# Patient Record
Sex: FEMALE | Race: WHITE | NOT HISPANIC OR LATINO | Employment: OTHER | URBAN - METROPOLITAN AREA
[De-identification: names, ages, dates, MRNs, and addresses within clinical notes are randomized per-mention and may not be internally consistent; named-entity substitution may affect disease eponyms.]

---

## 2017-04-18 ENCOUNTER — ALLSCRIPTS OFFICE VISIT (OUTPATIENT)
Dept: OTHER | Facility: OTHER | Age: 57
End: 2017-04-18

## 2017-04-18 DIAGNOSIS — Z12.31 ENCOUNTER FOR SCREENING MAMMOGRAM FOR MALIGNANT NEOPLASM OF BREAST: ICD-10-CM

## 2017-04-18 PROCEDURE — 87624 HPV HI-RISK TYP POOLED RSLT: CPT | Performed by: OBSTETRICS & GYNECOLOGY

## 2017-04-18 PROCEDURE — G0145 SCR C/V CYTO,THINLAYER,RESCR: HCPCS | Performed by: OBSTETRICS & GYNECOLOGY

## 2017-04-20 ENCOUNTER — LAB REQUISITION (OUTPATIENT)
Dept: LAB | Facility: HOSPITAL | Age: 57
End: 2017-04-20
Payer: COMMERCIAL

## 2017-04-20 DIAGNOSIS — Z11.51 ENCOUNTER FOR SCREENING FOR HUMAN PAPILLOMAVIRUS (HPV): ICD-10-CM

## 2017-04-20 DIAGNOSIS — Z12.4 ENCOUNTER FOR SCREENING FOR MALIGNANT NEOPLASM OF CERVIX: ICD-10-CM

## 2017-04-21 LAB — HPV RRNA GENITAL QL NAA+PROBE: NORMAL

## 2017-04-25 LAB
LAB AP GYN PRIMARY INTERPRETATION: NORMAL
Lab: NORMAL

## 2017-06-14 ENCOUNTER — HOSPITAL ENCOUNTER (OUTPATIENT)
Dept: BONE DENSITY | Facility: IMAGING CENTER | Age: 57
Discharge: HOME | End: 2017-06-14
Payer: COMMERCIAL

## 2017-06-14 DIAGNOSIS — Z12.31 ENCOUNTER FOR SCREENING MAMMOGRAM FOR MALIGNANT NEOPLASM OF BREAST: ICD-10-CM

## 2017-06-14 PROCEDURE — G0202 SCR MAMMO BI INCL CAD: HCPCS

## 2018-01-14 VITALS
WEIGHT: 126.13 LBS | DIASTOLIC BLOOD PRESSURE: 64 MMHG | SYSTOLIC BLOOD PRESSURE: 118 MMHG | BODY MASS INDEX: 24.76 KG/M2 | HEIGHT: 60 IN

## 2018-08-13 ENCOUNTER — ANNUAL EXAM (OUTPATIENT)
Dept: OBGYN CLINIC | Facility: CLINIC | Age: 58
End: 2018-08-13
Payer: COMMERCIAL

## 2018-08-13 VITALS
DIASTOLIC BLOOD PRESSURE: 70 MMHG | HEIGHT: 62 IN | SYSTOLIC BLOOD PRESSURE: 112 MMHG | BODY MASS INDEX: 23.96 KG/M2 | WEIGHT: 130.2 LBS

## 2018-08-13 DIAGNOSIS — Z01.419 ENCOUNTER FOR ANNUAL ROUTINE GYNECOLOGICAL EXAMINATION: Primary | ICD-10-CM

## 2018-08-13 DIAGNOSIS — Z12.31 ENCOUNTER FOR SCREENING MAMMOGRAM FOR BREAST CANCER: ICD-10-CM

## 2018-08-13 PROCEDURE — 99396 PREV VISIT EST AGE 40-64: CPT | Performed by: OBSTETRICS & GYNECOLOGY

## 2018-08-13 RX ORDER — POLYMYXIN B SULFATE AND TRIMETHOPRIM 1; 10000 MG/ML; [USP'U]/ML
SOLUTION OPHTHALMIC
Refills: 0 | COMMUNITY
Start: 2018-06-04 | End: 2020-01-18 | Stop reason: ALTCHOICE

## 2018-08-13 NOTE — PROGRESS NOTES
Assessment/Plan:    Mammogram reviewed with her, prescription given so that she can schedule this    Pap up to date, discussed Pap guidelines    We reviewed DEXA guidelines, discussed weight-bearing exercise, muscle strengthening exercise, balance and flexibility, calcium and vitamin-D    Colon cancer screening is due, she was given information for GI  She checked with her insurance last year and could not find a female physician but she is going to recheck this year  No problem-specific Assessment & Plan notes found for this encounter  Diagnoses and all orders for this visit:    Encounter for annual routine gynecological examination    Encounter for screening mammogram for breast cancer    Other orders  -     polymyxin b-trimethoprim (POLYTRIM) ophthalmic solution; INSTILL 1 DROP INTO AFFECTED EYE 4 TIMES A DAY FOR 5 DAYS          Subjective:      Patient ID: Graciela Gallagher is a 62 y o  female  Patient here for yearly  She had a normal Pap and negative HPV in 2017  She is due for a mammogram   She has no gyn complaints  She has some questions about baseline DEXA  The following portions of the patient's history were reviewed and updated as appropriate: allergies, current medications, past family history, past medical history, past social history, past surgical history and problem list     Review of Systems   Constitutional: Negative  HENT: Negative  Eyes: Negative  Respiratory: Negative  Cardiovascular: Negative  Gastrointestinal: Negative  Endocrine: Negative  Genitourinary: Negative  Musculoskeletal: Negative  Skin: Negative  Allergic/Immunologic: Negative  Neurological: Negative  Hematological: Negative  Psychiatric/Behavioral: Negative  Objective:      /70 (BP Location: Left arm, Patient Position: Sitting, Cuff Size: Adult)   Ht 5' 2" (1 575 m)   Wt 59 1 kg (130 lb 3 2 oz)   LMP  (LMP Unknown)   Breastfeeding?  No   BMI 23 81 kg/m² Physical Exam   Constitutional: She appears well-developed  Neck: No tracheal deviation present  No thyromegaly present  Cardiovascular: Normal rate and regular rhythm  Pulmonary/Chest: Effort normal and breath sounds normal  Right breast exhibits no inverted nipple, no mass, no nipple discharge, no skin change and no tenderness  Left breast exhibits no inverted nipple, no mass, no nipple discharge, no skin change and no tenderness  Breasts are symmetrical    Examined seated and supine   Abdominal: Soft  She exhibits no distension and no mass  There is no tenderness  Genitourinary: Rectum normal, vagina normal and uterus normal  No labial fusion  There is no rash, tenderness, lesion or injury on the right labia  There is no rash, tenderness, lesion or injury on the left labia  Cervix exhibits no motion tenderness, no discharge and no friability  Right adnexum displays no mass, no tenderness and no fullness  Left adnexum displays no mass, no tenderness and no fullness  Vitals reviewed

## 2018-08-31 ENCOUNTER — HOSPITAL ENCOUNTER (OUTPATIENT)
Dept: BONE DENSITY | Facility: IMAGING CENTER | Age: 58
Discharge: HOME | End: 2018-08-31
Payer: COMMERCIAL

## 2018-08-31 DIAGNOSIS — Z12.31 ENCOUNTER FOR SCREENING MAMMOGRAM FOR BREAST CANCER: ICD-10-CM

## 2018-08-31 PROCEDURE — 77067 SCR MAMMO BI INCL CAD: CPT

## 2019-08-27 ENCOUNTER — ANNUAL EXAM (OUTPATIENT)
Dept: OBGYN CLINIC | Facility: CLINIC | Age: 59
End: 2019-08-27
Payer: COMMERCIAL

## 2019-08-27 VITALS
BODY MASS INDEX: 25.48 KG/M2 | HEIGHT: 60 IN | SYSTOLIC BLOOD PRESSURE: 104 MMHG | WEIGHT: 129.8 LBS | DIASTOLIC BLOOD PRESSURE: 60 MMHG

## 2019-08-27 DIAGNOSIS — R29.890 LOSS OF HEIGHT: ICD-10-CM

## 2019-08-27 DIAGNOSIS — Z12.31 ENCOUNTER FOR SCREENING MAMMOGRAM FOR BREAST CANCER: ICD-10-CM

## 2019-08-27 DIAGNOSIS — Z01.419 ENCOUNTER FOR ANNUAL ROUTINE GYNECOLOGICAL EXAMINATION: Primary | ICD-10-CM

## 2019-08-27 PROCEDURE — 99396 PREV VISIT EST AGE 40-64: CPT | Performed by: OBSTETRICS & GYNECOLOGY

## 2019-08-27 NOTE — PROGRESS NOTES
Assessment/Plan:    pap is up to date    mammogram reviewed with her including breast density  RX given so that she can schedule this  Discussed self breast exams    Loss of height-I reviewed weight-bearing and muscle strengthening exercises with her along with calcium and D  I gave her a slip for a DEXA and recommended that she call to check on coverage before having it done  colon cancer screening - colonoscopy done in 2013, due in 2023    discussed preventive care, regular exercise and a healthy diet      No problem-specific Assessment & Plan notes found for this encounter  Diagnoses and all orders for this visit:    Encounter for annual routine gynecological examination    Encounter for screening mammogram for breast cancer  -     Mammo screening bilateral w 3d & cad; Future    Loss of height  -     DXA bone density spine hip and pelvis; Future          Subjective:      Patient ID: Meena Jacob is a 61 y o  female  Pt here for yearly  She has no gyn complaints  She is concerned about her loss of height  She states that she was always 5'2"but last year and this year she measured 5'  Her mother has osteoporosis  She is a nonsmoker and does not take medications that would decrease bone density  The following portions of the patient's history were reviewed and updated as appropriate: allergies, current medications, past family history, past medical history, past social history, past surgical history and problem list     Review of Systems   Constitutional: Negative  HENT: Negative  Eyes: Negative  Respiratory: Negative  Cardiovascular: Negative  Gastrointestinal: Negative  Endocrine: Negative  Genitourinary: Negative  Musculoskeletal: Negative  Skin: Negative  Allergic/Immunologic: Negative  Neurological: Negative  Hematological: Negative  Psychiatric/Behavioral: Negative            Objective:      /60 (BP Location: Left arm, Patient Position: Sitting, Cuff Size: Standard)   Ht 5' (1 524 m)   Wt 58 9 kg (129 lb 12 8 oz)   LMP  (LMP Unknown)   BMI 25 35 kg/m²          Physical Exam   Constitutional: She appears well-developed  Neck: No tracheal deviation present  No thyromegaly present  Cardiovascular: Normal rate and regular rhythm  Pulmonary/Chest: Effort normal and breath sounds normal  Right breast exhibits no inverted nipple, no mass, no nipple discharge, no skin change and no tenderness  Left breast exhibits no inverted nipple, no mass, no nipple discharge, no skin change and no tenderness  Breasts are symmetrical    Examined seated and supine   Abdominal: Soft  She exhibits no distension and no mass  There is no tenderness  Genitourinary: Rectum normal, vagina normal and uterus normal  No labial fusion  There is no rash, tenderness, lesion or injury on the right labia  There is no rash, tenderness, lesion or injury on the left labia  Cervix exhibits no motion tenderness, no discharge and no friability  Right adnexum displays no mass, no tenderness and no fullness  Left adnexum displays no mass, no tenderness and no fullness  Vitals reviewed

## 2019-10-04 ENCOUNTER — OFFICE VISIT (OUTPATIENT)
Dept: FAMILY MEDICINE CLINIC | Facility: CLINIC | Age: 59
End: 2019-10-04
Payer: COMMERCIAL

## 2019-10-04 VITALS
SYSTOLIC BLOOD PRESSURE: 120 MMHG | OXYGEN SATURATION: 94 % | TEMPERATURE: 99.4 F | HEART RATE: 106 BPM | BODY MASS INDEX: 25.91 KG/M2 | WEIGHT: 132 LBS | DIASTOLIC BLOOD PRESSURE: 84 MMHG | HEIGHT: 60 IN

## 2019-10-04 DIAGNOSIS — N30.01 ACUTE CYSTITIS WITH HEMATURIA: Primary | ICD-10-CM

## 2019-10-04 DIAGNOSIS — Z23 NEED FOR INFLUENZA VACCINATION: ICD-10-CM

## 2019-10-04 DIAGNOSIS — Z23 NEED FOR TDAP VACCINATION: ICD-10-CM

## 2019-10-04 LAB
SL AMB  POCT GLUCOSE, UA: NEGATIVE
SL AMB LEUKOCYTE ESTERASE,UA: ABNORMAL
SL AMB POCT BILIRUBIN,UA: NEGATIVE
SL AMB POCT BLOOD,UA: ABNORMAL
SL AMB POCT CLARITY,UA: ABNORMAL
SL AMB POCT COLOR,UA: ABNORMAL
SL AMB POCT KETONES,UA: NEGATIVE
SL AMB POCT NITRITE,UA: NEGATIVE
SL AMB POCT PH,UA: 6.5
SL AMB POCT SPECIFIC GRAVITY,UA: 1.02
SL AMB POCT URINE PROTEIN: ABNORMAL
SL AMB POCT UROBILINOGEN: 0.2

## 2019-10-04 PROCEDURE — 99203 OFFICE O/P NEW LOW 30 MIN: CPT | Performed by: FAMILY MEDICINE

## 2019-10-04 PROCEDURE — 81002 URINALYSIS NONAUTO W/O SCOPE: CPT | Performed by: FAMILY MEDICINE

## 2019-10-04 RX ORDER — DIPHENOXYLATE HYDROCHLORIDE AND ATROPINE SULFATE 2.5; .025 MG/1; MG/1
1 TABLET ORAL DAILY
COMMUNITY
End: 2021-07-29

## 2019-10-04 RX ORDER — SULFAMETHOXAZOLE AND TRIMETHOPRIM 800; 160 MG/1; MG/1
1 TABLET ORAL EVERY 12 HOURS SCHEDULED
Qty: 6 TABLET | Refills: 0 | Status: SHIPPED | OUTPATIENT
Start: 2019-10-04 | End: 2019-10-07

## 2019-10-04 NOTE — PATIENT INSTRUCTIONS

## 2019-10-04 NOTE — PROGRESS NOTES
Subjective:   Chief Complaint   Patient presents with    New Patient     pt was last seen at [facility]  Pt is changing practices because she was unhappy with her previous PCP   Urinary Tract Infection     pt woke up with frequency, urgency, pressure, and she noticed some discoloration when wiping  Pt defers tdap and flu shot today  Patient ID: Eve Gao is a 61 y o  female  Patient is here today with symptoms of a urinary tract infection  she has had had urinary tract infections in the past  sx for 1 days  + urinary frequency  + urgency  + dysuria   + hematuria  no flank pain  no fever/chills  no n/v          The following portions of the patient's history were reviewed and updated as appropriate: allergies, current medications, past family history, past medical history, past social history, past surgical history and problem list     Review of Systems      Objective:  Vitals:    10/04/19 1344   BP: 120/84   Pulse: (!) 106   Temp: 99 4 °F (37 4 °C)   SpO2: 94%   Weight: 59 9 kg (132 lb)   Height: 5' (1 524 m)      Physical Exam   Constitutional: She is oriented to person, place, and time  Vital signs are normal  She appears well-developed and well-nourished  She does not appear ill  No distress  Abdominal: Soft  Normal appearance  There is no CVA tenderness  Neurological: She is alert and oriented to person, place, and time  No cranial nerve deficit  Coordination normal    Skin: Skin is warm, dry and intact  No rash noted  She is not diaphoretic  No erythema  Psychiatric: She has a normal mood and affect  Her behavior is normal  Judgment and thought content normal        Diabetic Foot Exam      Assessment/Plan:    No problem-specific Assessment & Plan notes found for this encounter  Diagnoses and all orders for this visit:    Acute cystitis with hematuria  -     sulfamethoxazole-trimethoprim (BACTRIM DS) 800-160 mg per tablet;  Take 1 tablet by mouth every 12 (twelve) hours for 3 days    I suspect that the pt has a UTI  I prescribed antibiotics and will send the urine for culture  BMI 25 0-25 9,adult    Other orders  -     multivitamin (THERAGRAN) TABS; Take 1 tablet by mouth daily  -     MELATONIN PO; Take by mouth  -     Hyaluronic Acid-Vitamin C (HYALURONIC ACID PO); Take by mouth      BMI Counseling: Body mass index is 25 78 kg/m²  The BMI is above normal  Exercise recommendations include exercising 3-5 times per week

## 2019-10-25 ENCOUNTER — HOSPITAL ENCOUNTER (OUTPATIENT)
Dept: MAMMOGRAPHY | Facility: IMAGING CENTER | Age: 59
Discharge: HOME | End: 2019-10-25
Payer: COMMERCIAL

## 2019-10-25 VITALS — BODY MASS INDEX: 24.92 KG/M2 | WEIGHT: 132 LBS | HEIGHT: 61 IN

## 2019-10-25 DIAGNOSIS — Z12.31 ENCOUNTER FOR SCREENING MAMMOGRAM FOR BREAST CANCER: ICD-10-CM

## 2019-10-25 PROCEDURE — 77067 SCR MAMMO BI INCL CAD: CPT

## 2019-10-25 PROCEDURE — 77063 BREAST TOMOSYNTHESIS BI: CPT

## 2019-12-03 ENCOUNTER — TELEPHONE (OUTPATIENT)
Dept: FAMILY MEDICINE CLINIC | Facility: CLINIC | Age: 59
End: 2019-12-03

## 2019-12-03 DIAGNOSIS — J45.20 MILD INTERMITTENT ASTHMA WITHOUT COMPLICATION: Primary | ICD-10-CM

## 2019-12-03 RX ORDER — ALBUTEROL SULFATE 90 UG/1
2 AEROSOL, METERED RESPIRATORY (INHALATION) EVERY 6 HOURS PRN
Qty: 1 EACH | Refills: 1 | Status: SHIPPED | OUTPATIENT
Start: 2019-12-03 | End: 2020-01-18 | Stop reason: ALTCHOICE

## 2020-01-18 ENCOUNTER — OFFICE VISIT (OUTPATIENT)
Dept: FAMILY MEDICINE CLINIC | Facility: CLINIC | Age: 60
End: 2020-01-18
Payer: COMMERCIAL

## 2020-01-18 VITALS
BODY MASS INDEX: 25.34 KG/M2 | HEIGHT: 61 IN | HEART RATE: 128 BPM | WEIGHT: 134.25 LBS | OXYGEN SATURATION: 98 % | TEMPERATURE: 98.6 F | DIASTOLIC BLOOD PRESSURE: 80 MMHG | SYSTOLIC BLOOD PRESSURE: 113 MMHG

## 2020-01-18 DIAGNOSIS — Z13.220 SCREENING FOR LIPID DISORDERS: ICD-10-CM

## 2020-01-18 DIAGNOSIS — Z13.0 SCREENING FOR ENDOCRINE, METABOLIC AND IMMUNITY DISORDER: ICD-10-CM

## 2020-01-18 DIAGNOSIS — Z13.29 SCREENING FOR ENDOCRINE, METABOLIC AND IMMUNITY DISORDER: ICD-10-CM

## 2020-01-18 DIAGNOSIS — G56.03 CARPAL TUNNEL SYNDROME ON BOTH SIDES: Primary | ICD-10-CM

## 2020-01-18 DIAGNOSIS — Z13.0 SCREENING FOR IRON DEFICIENCY ANEMIA: ICD-10-CM

## 2020-01-18 DIAGNOSIS — Z13.29 SCREENING FOR THYROID DISORDER: ICD-10-CM

## 2020-01-18 DIAGNOSIS — Z13.228 SCREENING FOR ENDOCRINE, METABOLIC AND IMMUNITY DISORDER: ICD-10-CM

## 2020-01-18 DIAGNOSIS — Z11.59 ENCOUNTER FOR HEPATITIS C SCREENING TEST FOR LOW RISK PATIENT: ICD-10-CM

## 2020-01-18 PROCEDURE — 99213 OFFICE O/P EST LOW 20 MIN: CPT | Performed by: FAMILY MEDICINE

## 2020-01-18 NOTE — PATIENT INSTRUCTIONS
Schedule fasting blood work - can schedule here on Tuesday or Thursday am or at lab  Wrist splints Deanna iglesias 89   If symptoms persist, consider emg  Ibuprofen or advil as needed     Carpal Tunnel Syndrome   WHAT YOU SHOULD KNOW:   Carpal tunnel syndrome (CTS) is a condition where there is increased pressure on the median nerve in the wrist  The median nerve controls muscles and feeling in the hand  Pressure may come from overuse and swelling of ligaments in the wrist    INSTRUCTIONS:   Medicines:   · NSAIDs:  These medicines decrease swelling and pain  NSAIDs are available without a doctor's order  Ask your primary healthcare provider which medicine is right for you and how much to take  Take as directed  NSAIDs can cause stomach bleeding or kidney problems if not taken correctly  · Take your medicine as directed  Call your healthcare provider if you think your medicine is not helping or if you have side effects  Tell him if you are allergic to any medicine  Keep a list of the medicines, vitamins, and herbs you take  Include the amounts, and when and why you take them  Bring the list or the pill bottles to follow-up visits  Carry your medicine list with you in case of an emergency  Follow up with your healthcare provider as directed:  Write down your questions so you remember to ask them during your visits  Manage your symptoms:   · Use ice:  Ice helps decrease swelling and pain in your wrist  Ice may also help prevent tissue damage  Use an ice pack or put crushed ice in a plastic bag  Cover the ice pack with a towel and place it on your wrist for 15 to 20 minutes every hour  · Get physical and occupational therapy:  Physical therapists will show you ways to exercise and strengthen your wrist  Occupational therapists will show you safe ways to use your wrist while you do your usual activities       · Rest your hands:  Let your hands rest for a short time between repetitive motions, such as typing  If you feel pain, stop what you are doing and gently massage your wrist and hand  · Use a wrist splint: This keeps your wrist straight or in a slightly bent position  A wrist splint decreases pressure on the median nerve by letting your wrist rest  You may need to wear the splint for up to 8 weeks  You may need to wear your wrist splint at night  · Evaluate your work habits:  Ask about ways to modify your work to help decrease your symptoms  Contact your primary healthcare provider if:   · Your symptoms are worse than before  · You have questions or concerns about your condition or care  Return to the emergency department if:   · You suddenly lose feeling and cannot move your hand  · Your hand suddenly changes color  © 2014 [employer] is for End User's use only and may not be sold, redistributed or otherwise used for commercial purposes  All illustrations and images included in CareNotes® are the copyrighted property of A D A M [employer]  or Angel Gao  The above information is an  only  It is not intended as medical advice for individual conditions or treatments  Talk to your doctor, nurse or pharmacist before following any medical regimen to see if it is safe and effective for you
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
## 2020-01-20 ENCOUNTER — VBI (OUTPATIENT)
Dept: FAMILY MEDICINE CLINIC | Facility: CLINIC | Age: 60
End: 2020-01-20

## 2020-01-20 NOTE — TELEPHONE ENCOUNTER
Tori Rider MA  [facility]             01/18/20 9:11 AM     Hello, our patient Chato Guardaod has had CRC: Colonoscopy completed/performed  Please assist in updating the patient chart by making an External outreach to [facility] located in [place], [place]  The date of service is 2013 according to the patient  She would be due in 2023  Thank you,   Tori Rider MA   [facility] [address] [address]     Faxed to [facility] [phone] [facility] [place], [place] 01/20/20 Kennedi Yoon MA     Received Colonoscopy DOS 9/6/2011 with 5-10 year recall; DOS is unclear on image  01/21/2020 10:35 AM Phone (Jens Reese) Barbara Tuttle at [facility] (Provider) [phone] Remove   Call Complete - She confirmed DOS is 9/6/2011; this was in an old system where records were scanned in; image of report is also unclear        By Kennedi Yoon MA

## 2020-01-20 NOTE — LETTER
Procedure Request Form: Colonoscopy      Date Requested: 20  Patient: Romulo Elders  Patient : 1960   Referring Provider: Niru Jeffry, MD        Date of Procedure ______________________________       The above patient has informed us that they have completed their   most recent Colonoscopy at your facility  Please complete   this form and attach all corresponding procedure reports/results  Comments __________________________________________________________  ____________________________________________________________________  ____________________________________________________________________  ____________________________________________________________________    Facility Completing Procedure _________________________________________    Form Completed By (print name) _______________________________________      Signature __________________________________________________________      These reports are needed for  compliance    Please fax this completed form and a copy of the procedure report to our office as soon as possible to [phone] magi Hutchinson: Phone [phone]    We thank you for your assistance in treating our mutual patient     (sent to [facility])

## 2020-08-30 ENCOUNTER — TELEPHONE (OUTPATIENT)
Dept: OTHER | Facility: OTHER | Age: 60
End: 2020-08-30

## 2020-09-07 ENCOUNTER — TELEPHONE (OUTPATIENT)
Dept: OTHER | Facility: OTHER | Age: 60
End: 2020-09-07

## 2020-10-08 ENCOUNTER — OFFICE VISIT (OUTPATIENT)
Dept: FAMILY MEDICINE CLINIC | Facility: CLINIC | Age: 60
End: 2020-10-08
Payer: COMMERCIAL

## 2020-10-08 VITALS
OXYGEN SATURATION: 98 % | SYSTOLIC BLOOD PRESSURE: 92 MMHG | HEIGHT: 61 IN | BODY MASS INDEX: 25.06 KG/M2 | TEMPERATURE: 97.9 F | WEIGHT: 132.75 LBS | HEART RATE: 97 BPM | DIASTOLIC BLOOD PRESSURE: 68 MMHG

## 2020-10-08 DIAGNOSIS — Z13.0 SCREENING, ANEMIA, DEFICIENCY, IRON: ICD-10-CM

## 2020-10-08 DIAGNOSIS — Z13.29 SCREENING FOR THYROID DISORDER: ICD-10-CM

## 2020-10-08 DIAGNOSIS — Z13.220 SCREENING FOR LIPID DISORDERS: ICD-10-CM

## 2020-10-08 DIAGNOSIS — Z11.59 ENCOUNTER FOR HEPATITIS C SCREENING TEST FOR LOW RISK PATIENT: ICD-10-CM

## 2020-10-08 DIAGNOSIS — Z13.1 SCREENING FOR DIABETES MELLITUS: ICD-10-CM

## 2020-10-08 DIAGNOSIS — Z00.00 ANNUAL PHYSICAL EXAM: Primary | ICD-10-CM

## 2020-10-08 PROCEDURE — 99396 PREV VISIT EST AGE 40-64: CPT | Performed by: FAMILY MEDICINE

## 2020-11-16 ENCOUNTER — TELEPHONE (OUTPATIENT)
Dept: OBGYN CLINIC | Facility: CLINIC | Age: 60
End: 2020-11-16

## 2020-11-17 ENCOUNTER — ANNUAL EXAM (OUTPATIENT)
Dept: OBGYN CLINIC | Facility: CLINIC | Age: 60
End: 2020-11-17
Payer: COMMERCIAL

## 2020-11-17 VITALS
DIASTOLIC BLOOD PRESSURE: 82 MMHG | TEMPERATURE: 97.2 F | SYSTOLIC BLOOD PRESSURE: 124 MMHG | WEIGHT: 132 LBS | HEIGHT: 60 IN | BODY MASS INDEX: 25.91 KG/M2

## 2020-11-17 DIAGNOSIS — Z12.31 ENCOUNTER FOR SCREENING MAMMOGRAM FOR BREAST CANCER: ICD-10-CM

## 2020-11-17 DIAGNOSIS — Z01.419 ENCOUNTER FOR ANNUAL ROUTINE GYNECOLOGICAL EXAMINATION: Primary | ICD-10-CM

## 2020-11-17 PROCEDURE — 99396 PREV VISIT EST AGE 40-64: CPT | Performed by: OBSTETRICS & GYNECOLOGY

## 2020-12-03 ENCOUNTER — TELEMEDICINE (OUTPATIENT)
Dept: FAMILY MEDICINE CLINIC | Facility: CLINIC | Age: 60
End: 2020-12-03
Payer: COMMERCIAL

## 2020-12-03 VITALS — TEMPERATURE: 97.1 F | WEIGHT: 132 LBS | BODY MASS INDEX: 25.91 KG/M2 | HEIGHT: 60 IN

## 2020-12-03 DIAGNOSIS — B34.9 VIRAL INFECTION, UNSPECIFIED: ICD-10-CM

## 2020-12-03 DIAGNOSIS — B34.9 VIRAL INFECTION, UNSPECIFIED: Primary | ICD-10-CM

## 2020-12-03 PROCEDURE — U0003 INFECTIOUS AGENT DETECTION BY NUCLEIC ACID (DNA OR RNA); SEVERE ACUTE RESPIRATORY SYNDROME CORONAVIRUS 2 (SARS-COV-2) (CORONAVIRUS DISEASE [COVID-19]), AMPLIFIED PROBE TECHNIQUE, MAKING USE OF HIGH THROUGHPUT TECHNOLOGIES AS DESCRIBED BY CMS-2020-01-R: HCPCS | Performed by: FAMILY MEDICINE

## 2020-12-03 PROCEDURE — 99213 OFFICE O/P EST LOW 20 MIN: CPT | Performed by: FAMILY MEDICINE

## 2020-12-04 LAB — SARS-COV-2 RNA SPEC QL NAA+PROBE: DETECTED

## 2020-12-07 ENCOUNTER — TELEPHONE (OUTPATIENT)
Dept: FAMILY MEDICINE CLINIC | Facility: CLINIC | Age: 60
End: 2020-12-07

## 2020-12-08 ENCOUNTER — TELEPHONE (OUTPATIENT)
Dept: FAMILY MEDICINE CLINIC | Facility: CLINIC | Age: 60
End: 2020-12-08

## 2020-12-08 ENCOUNTER — TELEPHONE (OUTPATIENT)
Dept: OTHER | Facility: OTHER | Age: 60
End: 2020-12-08

## 2020-12-14 ENCOUNTER — TELEPHONE (OUTPATIENT)
Dept: FAMILY MEDICINE CLINIC | Facility: CLINIC | Age: 60
End: 2020-12-14

## 2021-01-26 ENCOUNTER — HOSPITAL ENCOUNTER (OUTPATIENT)
Dept: MAMMOGRAPHY | Facility: IMAGING CENTER | Age: 61
Discharge: HOME | End: 2021-01-26
Payer: COMMERCIAL

## 2021-01-26 VITALS — BODY MASS INDEX: 25.91 KG/M2 | HEIGHT: 60 IN | WEIGHT: 132 LBS

## 2021-01-26 DIAGNOSIS — Z12.31 ENCOUNTER FOR SCREENING MAMMOGRAM FOR BREAST CANCER: ICD-10-CM

## 2021-01-26 PROCEDURE — 77063 BREAST TOMOSYNTHESIS BI: CPT

## 2021-01-26 PROCEDURE — 77067 SCR MAMMO BI INCL CAD: CPT

## 2021-02-17 ENCOUNTER — TELEPHONE (OUTPATIENT)
Dept: FAMILY MEDICINE CLINIC | Facility: CLINIC | Age: 61
End: 2021-02-17

## 2021-02-17 NOTE — TELEPHONE ENCOUNTER
So actually she can't get a test now - she is within 90 days of when she actually had COVID and during those 90 days we do not restest   She should have very good immunity for 90 days from her illness, likely more than 90 days

## 2021-02-17 NOTE — TELEPHONE ENCOUNTER
Pt is asking if you can put an order for covid test    pt was exposure on 2/7 Sunday  Pt is not experiencing symptoms and no mask on  Please adv pt [phone]

## 2021-02-17 NOTE — TELEPHONE ENCOUNTER
At this time without symptoms the patient is past her 10 day quarantine and technically does not need a test   I can order one if she really wants one but it is not necessary

## 2021-06-08 DIAGNOSIS — Z13.29 SCREENING FOR ENDOCRINE, METABOLIC AND IMMUNITY DISORDER: ICD-10-CM

## 2021-06-08 DIAGNOSIS — Z13.0 SCREENING FOR ENDOCRINE, METABOLIC AND IMMUNITY DISORDER: ICD-10-CM

## 2021-06-08 DIAGNOSIS — Z11.59 ENCOUNTER FOR HEPATITIS C SCREENING TEST FOR LOW RISK PATIENT: ICD-10-CM

## 2021-06-08 DIAGNOSIS — Z13.220 SCREENING FOR LIPID DISORDERS: ICD-10-CM

## 2021-06-08 DIAGNOSIS — Z13.29 SCREENING FOR THYROID DISORDER: ICD-10-CM

## 2021-06-08 DIAGNOSIS — Z13.228 SCREENING FOR ENDOCRINE, METABOLIC AND IMMUNITY DISORDER: ICD-10-CM

## 2021-06-08 DIAGNOSIS — Z13.0 SCREENING FOR IRON DEFICIENCY ANEMIA: ICD-10-CM

## 2021-06-08 DIAGNOSIS — Z13.0 SCREENING, ANEMIA, DEFICIENCY, IRON: ICD-10-CM

## 2021-07-29 ENCOUNTER — OFFICE VISIT (OUTPATIENT)
Dept: FAMILY MEDICINE CLINIC | Facility: CLINIC | Age: 61
End: 2021-07-29
Payer: COMMERCIAL

## 2021-07-29 VITALS
HEIGHT: 60 IN | OXYGEN SATURATION: 98 % | WEIGHT: 132 LBS | DIASTOLIC BLOOD PRESSURE: 62 MMHG | BODY MASS INDEX: 25.91 KG/M2 | SYSTOLIC BLOOD PRESSURE: 122 MMHG | HEART RATE: 78 BPM
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
## 2021-07-29 DIAGNOSIS — S13.9XXA NECK SPRAIN, INITIAL ENCOUNTER: ICD-10-CM

## 2021-07-29 DIAGNOSIS — M79.673 PAIN OF FOOT, UNSPECIFIED LATERALITY: Primary | ICD-10-CM

## 2021-07-29 PROBLEM — Z13.0 SCREENING FOR IRON DEFICIENCY ANEMIA: Status: RESOLVED | Noted: 2020-01-18 | Resolved: 2021-07-29

## 2021-07-29 PROCEDURE — 99213 OFFICE O/P EST LOW 20 MIN: CPT | Performed by: FAMILY MEDICINE

## 2021-07-29 RX ORDER — OMEGA-3/DHA/EPA/FISH OIL 300-1000MG
CAPSULE ORAL
COMMUNITY

## 2021-07-29 RX ORDER — DOXYCYCLINE 100 MG/10ML
INJECTION, POWDER, LYOPHILIZED, FOR SOLUTION INTRAVENOUS
COMMUNITY
End: 2021-07-29 | Stop reason: ALTCHOICE

## 2021-07-29 RX ORDER — ALBUTEROL SULFATE 90 UG/1
AEROSOL, METERED RESPIRATORY (INHALATION)
COMMUNITY
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
## 2021-07-30 ENCOUNTER — TELEPHONE (OUTPATIENT)
Dept: FAMILY MEDICINE CLINIC | Facility: CLINIC | Age: 61
End: 2021-07-30

## 2021-07-30 ENCOUNTER — EVALUATION (OUTPATIENT)
Dept: PHYSICAL THERAPY | Facility: CLINIC | Age: 61
End: 2021-07-30
Payer: COMMERCIAL

## 2021-07-30 DIAGNOSIS — S13.9XXA NECK SPRAIN, INITIAL ENCOUNTER: Primary | ICD-10-CM

## 2021-07-30 PROCEDURE — 97014 ELECTRIC STIMULATION THERAPY: CPT | Performed by: PHYSICAL THERAPIST

## 2021-07-30 PROCEDURE — 97162 PT EVAL MOD COMPLEX 30 MIN: CPT | Performed by: PHYSICAL THERAPIST

## 2021-07-30 PROCEDURE — 97140 MANUAL THERAPY 1/> REGIONS: CPT | Performed by: PHYSICAL THERAPIST

## 2021-07-30 NOTE — TELEPHONE ENCOUNTER
She needs a referral to go to PT for her right foot  She is going to [facility]  Can you put one in for her  She said she talked to you yesterday about it
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
## 2021-08-02 ENCOUNTER — EVALUATION (OUTPATIENT)
Dept: PHYSICAL THERAPY | Facility: CLINIC | Age: 61
End: 2021-08-02
Payer: COMMERCIAL

## 2021-08-02 DIAGNOSIS — M77.31 HEEL SPUR, RIGHT: ICD-10-CM

## 2021-08-02 DIAGNOSIS — M72.2 PLANTAR FASCIITIS OF RIGHT FOOT: ICD-10-CM

## 2021-08-02 DIAGNOSIS — M79.672 PAIN IN BOTH FEET: Primary | ICD-10-CM

## 2021-08-02 DIAGNOSIS — M79.671 PAIN IN BOTH FEET: Primary | ICD-10-CM

## 2021-08-02 PROCEDURE — 97162 PT EVAL MOD COMPLEX 30 MIN: CPT | Performed by: PHYSICAL THERAPIST

## 2021-08-02 PROCEDURE — 97110 THERAPEUTIC EXERCISES: CPT | Performed by: PHYSICAL THERAPIST

## 2021-08-02 PROCEDURE — 97014 ELECTRIC STIMULATION THERAPY: CPT | Performed by: PHYSICAL THERAPIST
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
## 2021-08-04 ENCOUNTER — APPOINTMENT (OUTPATIENT)
Dept: PHYSICAL THERAPY | Facility: CLINIC | Age: 61
End: 2021-08-04
Payer: COMMERCIAL

## 2021-08-05 ENCOUNTER — OFFICE VISIT (OUTPATIENT)
Dept: PHYSICAL THERAPY | Facility: CLINIC | Age: 61
End: 2021-08-05
Payer: COMMERCIAL

## 2021-08-05 DIAGNOSIS — M79.671 PAIN IN BOTH FEET: Primary | ICD-10-CM

## 2021-08-05 DIAGNOSIS — M79.672 PAIN IN BOTH FEET: Primary | ICD-10-CM

## 2021-08-05 PROCEDURE — 97014 ELECTRIC STIMULATION THERAPY: CPT | Performed by: PHYSICAL THERAPIST

## 2021-08-05 PROCEDURE — 97140 MANUAL THERAPY 1/> REGIONS: CPT | Performed by: PHYSICAL THERAPIST

## 2021-08-05 PROCEDURE — 97530 THERAPEUTIC ACTIVITIES: CPT | Performed by: PHYSICAL THERAPIST
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
## 2021-08-09 ENCOUNTER — OFFICE VISIT (OUTPATIENT)
Dept: PHYSICAL THERAPY | Facility: CLINIC | Age: 61
End: 2021-08-09
Payer: COMMERCIAL

## 2021-08-09 DIAGNOSIS — M79.671 PAIN IN BOTH FEET: Primary | ICD-10-CM

## 2021-08-09 DIAGNOSIS — M79.672 PAIN IN BOTH FEET: Primary | ICD-10-CM

## 2021-08-09 PROCEDURE — 97110 THERAPEUTIC EXERCISES: CPT

## 2021-08-09 PROCEDURE — 97140 MANUAL THERAPY 1/> REGIONS: CPT
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
## 2021-08-11 ENCOUNTER — OFFICE VISIT (OUTPATIENT)
Dept: PHYSICAL THERAPY | Facility: CLINIC | Age: 61
End: 2021-08-11
Payer: COMMERCIAL

## 2021-08-11 DIAGNOSIS — M79.672 PAIN IN BOTH FEET: Primary | ICD-10-CM

## 2021-08-11 DIAGNOSIS — M79.671 PAIN IN BOTH FEET: Primary | ICD-10-CM

## 2021-08-11 PROCEDURE — 97110 THERAPEUTIC EXERCISES: CPT

## 2021-08-11 PROCEDURE — 97140 MANUAL THERAPY 1/> REGIONS: CPT
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
## 2021-08-13 ENCOUNTER — OFFICE VISIT (OUTPATIENT)
Dept: PHYSICAL THERAPY | Facility: CLINIC | Age: 61
End: 2021-08-13
Payer: COMMERCIAL

## 2021-08-13 DIAGNOSIS — S13.9XXA NECK SPRAIN, INITIAL ENCOUNTER: Primary | ICD-10-CM

## 2021-08-13 PROCEDURE — 97140 MANUAL THERAPY 1/> REGIONS: CPT

## 2021-08-13 PROCEDURE — 97110 THERAPEUTIC EXERCISES: CPT

## 2021-08-13 PROCEDURE — 97014 ELECTRIC STIMULATION THERAPY: CPT
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
## 2021-08-16 ENCOUNTER — OFFICE VISIT (OUTPATIENT)
Dept: PHYSICAL THERAPY | Facility: CLINIC | Age: 61
End: 2021-08-16
Payer: COMMERCIAL

## 2021-08-16 DIAGNOSIS — M79.672 PAIN IN BOTH FEET: Primary | ICD-10-CM

## 2021-08-16 DIAGNOSIS — M79.671 PAIN IN BOTH FEET: Primary | ICD-10-CM

## 2021-08-16 PROCEDURE — 97110 THERAPEUTIC EXERCISES: CPT | Performed by: PHYSICAL THERAPIST

## 2021-08-16 PROCEDURE — 97140 MANUAL THERAPY 1/> REGIONS: CPT | Performed by: PHYSICAL THERAPIST
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
## 2021-08-18 ENCOUNTER — OFFICE VISIT (OUTPATIENT)
Dept: PHYSICAL THERAPY | Facility: CLINIC | Age: 61
End: 2021-08-18
Payer: COMMERCIAL

## 2021-08-18 DIAGNOSIS — M79.671 PAIN IN BOTH FEET: Primary | ICD-10-CM

## 2021-08-18 DIAGNOSIS — M79.672 PAIN IN BOTH FEET: Primary | ICD-10-CM

## 2021-08-18 PROCEDURE — 97110 THERAPEUTIC EXERCISES: CPT

## 2021-08-18 PROCEDURE — 97140 MANUAL THERAPY 1/> REGIONS: CPT
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
## 2021-08-20 ENCOUNTER — OFFICE VISIT (OUTPATIENT)
Dept: PHYSICAL THERAPY | Facility: CLINIC | Age: 61
End: 2021-08-20
Payer: COMMERCIAL

## 2021-08-20 DIAGNOSIS — S13.9XXA NECK SPRAIN, INITIAL ENCOUNTER: Primary | ICD-10-CM

## 2021-08-20 PROCEDURE — 97140 MANUAL THERAPY 1/> REGIONS: CPT

## 2021-08-20 PROCEDURE — 97110 THERAPEUTIC EXERCISES: CPT

## 2021-08-20 PROCEDURE — 97112 NEUROMUSCULAR REEDUCATION: CPT
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
## 2021-08-23 ENCOUNTER — OFFICE VISIT (OUTPATIENT)
Dept: PHYSICAL THERAPY | Facility: CLINIC | Age: 61
End: 2021-08-23
Payer: COMMERCIAL

## 2021-08-23 DIAGNOSIS — M79.672 PAIN IN BOTH FEET: Primary | ICD-10-CM

## 2021-08-23 DIAGNOSIS — M79.671 PAIN IN BOTH FEET: Primary | ICD-10-CM

## 2021-08-23 PROCEDURE — 97140 MANUAL THERAPY 1/> REGIONS: CPT

## 2021-08-23 PROCEDURE — 97110 THERAPEUTIC EXERCISES: CPT
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
## 2021-08-25 ENCOUNTER — APPOINTMENT (OUTPATIENT)
Dept: PHYSICAL THERAPY | Facility: CLINIC | Age: 61
End: 2021-08-25
Payer: COMMERCIAL

## 2021-08-27 ENCOUNTER — OFFICE VISIT (OUTPATIENT)
Dept: PHYSICAL THERAPY | Facility: CLINIC | Age: 61
End: 2021-08-27
Payer: COMMERCIAL

## 2021-08-27 DIAGNOSIS — S13.9XXA NECK SPRAIN, INITIAL ENCOUNTER: Primary | ICD-10-CM

## 2021-08-27 PROCEDURE — 97112 NEUROMUSCULAR REEDUCATION: CPT

## 2021-08-27 PROCEDURE — 97140 MANUAL THERAPY 1/> REGIONS: CPT

## 2021-08-27 PROCEDURE — 97110 THERAPEUTIC EXERCISES: CPT
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
## 2021-08-30 ENCOUNTER — OFFICE VISIT (OUTPATIENT)
Dept: PHYSICAL THERAPY | Facility: CLINIC | Age: 61
End: 2021-08-30
Payer: COMMERCIAL

## 2021-08-30 DIAGNOSIS — M79.672 PAIN IN BOTH FEET: Primary | ICD-10-CM

## 2021-08-30 DIAGNOSIS — M79.671 PAIN IN BOTH FEET: Primary | ICD-10-CM

## 2021-08-30 PROCEDURE — 97140 MANUAL THERAPY 1/> REGIONS: CPT

## 2021-08-30 PROCEDURE — 97110 THERAPEUTIC EXERCISES: CPT
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
## 2021-09-01 ENCOUNTER — APPOINTMENT (OUTPATIENT)
Dept: PHYSICAL THERAPY | Facility: CLINIC | Age: 61
End: 2021-09-01
Payer: COMMERCIAL

## 2021-09-08 ENCOUNTER — APPOINTMENT (OUTPATIENT)
Dept: PHYSICAL THERAPY | Facility: CLINIC | Age: 61
End: 2021-09-08
Payer: COMMERCIAL

## 2021-09-09 ENCOUNTER — APPOINTMENT (OUTPATIENT)
Dept: PHYSICAL THERAPY | Facility: CLINIC | Age: 61
End: 2021-09-09
Payer: COMMERCIAL

## 2021-09-13 ENCOUNTER — APPOINTMENT (OUTPATIENT)
Dept: PHYSICAL THERAPY | Facility: CLINIC | Age: 61
End: 2021-09-13
Payer: COMMERCIAL

## 2021-09-15 ENCOUNTER — OFFICE VISIT (OUTPATIENT)
Dept: PHYSICAL THERAPY | Facility: CLINIC | Age: 61
End: 2021-09-15
Payer: COMMERCIAL

## 2021-09-15 DIAGNOSIS — M79.671 PAIN IN BOTH FEET: Primary | ICD-10-CM

## 2021-09-15 DIAGNOSIS — M79.672 PAIN IN BOTH FEET: Primary | ICD-10-CM

## 2021-09-15 PROCEDURE — 97140 MANUAL THERAPY 1/> REGIONS: CPT
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
## 2021-09-16 ENCOUNTER — APPOINTMENT (OUTPATIENT)
Dept: PHYSICAL THERAPY | Facility: CLINIC | Age: 61
End: 2021-09-16
Payer: COMMERCIAL

## 2021-09-20 ENCOUNTER — APPOINTMENT (OUTPATIENT)
Dept: PHYSICAL THERAPY | Facility: CLINIC | Age: 61
End: 2021-09-20
Payer: COMMERCIAL

## 2021-09-21 NOTE — PROGRESS NOTES
Pily Mendez has been d/c'd from PT for her cervical spine  She has achieved all of her functional goals and her pain levels have been radically reduced

## 2021-09-22 ENCOUNTER — OFFICE VISIT (OUTPATIENT)
Dept: PHYSICAL THERAPY | Facility: CLINIC | Age: 61
End: 2021-09-22
Payer: COMMERCIAL

## 2021-09-22 DIAGNOSIS — M79.672 PAIN IN BOTH FEET: Primary | ICD-10-CM

## 2021-09-22 DIAGNOSIS — M79.671 PAIN IN BOTH FEET: Primary | ICD-10-CM

## 2021-09-22 PROCEDURE — 97140 MANUAL THERAPY 1/> REGIONS: CPT | Performed by: PHYSICAL THERAPIST

## 2021-09-22 PROCEDURE — 97110 THERAPEUTIC EXERCISES: CPT | Performed by: PHYSICAL THERAPIST
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
## 2021-09-23 ENCOUNTER — APPOINTMENT (OUTPATIENT)
Dept: PHYSICAL THERAPY | Facility: CLINIC | Age: 61
End: 2021-09-23
Payer: COMMERCIAL

## 2021-09-27 ENCOUNTER — APPOINTMENT (OUTPATIENT)
Dept: PHYSICAL THERAPY | Facility: CLINIC | Age: 61
End: 2021-09-27
Payer: COMMERCIAL

## 2021-09-29 ENCOUNTER — APPOINTMENT (OUTPATIENT)
Dept: PHYSICAL THERAPY | Facility: CLINIC | Age: 61
End: 2021-09-29
Payer: COMMERCIAL

## 2021-10-06 ENCOUNTER — OFFICE VISIT (OUTPATIENT)
Dept: PHYSICAL THERAPY | Facility: CLINIC | Age: 61
End: 2021-10-06
Payer: COMMERCIAL

## 2021-10-06 DIAGNOSIS — M72.2 PLANTAR FASCIITIS OF RIGHT FOOT: Primary | ICD-10-CM

## 2021-10-06 DIAGNOSIS — M79.672 PAIN IN BOTH FEET: ICD-10-CM

## 2021-10-06 DIAGNOSIS — M77.31 HEEL SPUR, RIGHT: ICD-10-CM

## 2021-10-06 DIAGNOSIS — S13.9XXA NECK SPRAIN, INITIAL ENCOUNTER: ICD-10-CM

## 2021-10-06 DIAGNOSIS — M79.671 PAIN IN BOTH FEET: ICD-10-CM

## 2021-10-06 PROCEDURE — 97110 THERAPEUTIC EXERCISES: CPT | Performed by: PHYSICAL THERAPIST

## 2021-10-06 PROCEDURE — 97140 MANUAL THERAPY 1/> REGIONS: CPT | Performed by: PHYSICAL THERAPIST

## 2021-10-20 ENCOUNTER — APPOINTMENT (OUTPATIENT)
Dept: PHYSICAL THERAPY | Facility: CLINIC | Age: 61
End: 2021-10-20
Payer: COMMERCIAL

## 2022-02-15 ENCOUNTER — ANNUAL EXAM (OUTPATIENT)
Dept: OBGYN CLINIC | Facility: CLINIC | Age: 62
End: 2022-02-15
Payer: COMMERCIAL

## 2022-02-15 VITALS
HEIGHT: 60 IN | BODY MASS INDEX: 26.5 KG/M2 | SYSTOLIC BLOOD PRESSURE: 124 MMHG | DIASTOLIC BLOOD PRESSURE: 76 MMHG | WEIGHT: 135 LBS

## 2022-02-15 DIAGNOSIS — Z12.31 ENCOUNTER FOR SCREENING MAMMOGRAM FOR BREAST CANCER: ICD-10-CM

## 2022-02-15 DIAGNOSIS — Z12.4 CERVICAL CANCER SCREENING: Primary | ICD-10-CM

## 2022-02-15 DIAGNOSIS — Z01.419 ENCOUNTER FOR ANNUAL ROUTINE GYNECOLOGICAL EXAMINATION: ICD-10-CM

## 2022-02-15 DIAGNOSIS — Z11.51 SCREENING FOR HPV (HUMAN PAPILLOMAVIRUS): ICD-10-CM

## 2022-02-15 PROCEDURE — 99396 PREV VISIT EST AGE 40-64: CPT | Performed by: OBSTETRICS & GYNECOLOGY

## 2022-02-15 PROCEDURE — G0476 HPV COMBO ASSAY CA SCREEN: HCPCS | Performed by: OBSTETRICS & GYNECOLOGY

## 2022-02-15 PROCEDURE — G0143 SCR C/V CYTO,THINLAYER,RESCR: HCPCS | Performed by: OBSTETRICS & GYNECOLOGY

## 2022-02-15 NOTE — PROGRESS NOTES
Assessment/Plan:    Pap and HPV done today    mammogram reviewed with her including breast density  RX given so she can schedule     Discussed self breast exams    colon cancer screening - colonoscopy done in 2011 or 2013, due now, discussed colonoscopy and Cologuard, information given  She is unsure if she had polyps in the past   If so, she should have a colonoscopy    discussed preventive care, regular exercise and a healthy diet      No problem-specific Assessment & Plan notes found for this encounter  Diagnoses and all orders for this visit:    Encounter for annual routine gynecological examination    Encounter for screening mammogram for breast cancer  -     Mammo screening bilateral w 3d & cad; Future    Cervical cancer screening    Screening for HPV (human papillomavirus)          Subjective:      Patient ID: Bishop Crooks is a 64 y o  female  Pt here for yearly  She has no gyn complaints  She thinks that she is due for colonoscopy this year or next year  She is concerned because her father had an EGD and then had bleeding and passed away 2 weeks later  Normal pap, negative HPV in 2017  Normal 3D mammogram last January with fatty tissue and average risk  The following portions of the patient's history were reviewed and updated as appropriate: allergies, current medications, past family history, past medical history, past social history, past surgical history and problem list     Review of Systems   Constitutional: Negative  Gastrointestinal: Negative  Genitourinary: Negative  Objective:      LMP  (LMP Unknown)          Physical Exam  Vitals reviewed  Constitutional:       Appearance: She is well-developed  Neck:      Thyroid: No thyromegaly  Trachea: No tracheal deviation  Cardiovascular:      Rate and Rhythm: Normal rate and regular rhythm  Pulmonary:      Effort: Pulmonary effort is normal       Breath sounds: Normal breath sounds     Chest:   Breasts: Breasts are symmetrical       Right: No inverted nipple, mass, nipple discharge, skin change or tenderness  Left: No inverted nipple, mass, nipple discharge, skin change or tenderness  Abdominal:      General: There is no distension  Palpations: Abdomen is soft  There is no mass  Tenderness: There is no abdominal tenderness  Genitourinary:     Labia:         Right: No rash, tenderness, lesion or injury  Left: No rash, tenderness, lesion or injury  Vagina: Normal       Cervix: No cervical motion tenderness, discharge or friability  Adnexa:         Right: No mass, tenderness or fullness  Left: No mass, tenderness or fullness          Rectum: Normal

## 2022-02-17 LAB
HPV HR 12 DNA CVX QL NAA+PROBE: NEGATIVE
HPV16 DNA CVX QL NAA+PROBE: NEGATIVE
HPV18 DNA CVX QL NAA+PROBE: NEGATIVE

## 2022-02-23 LAB
LAB AP GYN PRIMARY INTERPRETATION: NORMAL
Lab: NORMAL

## 2022-03-08 ENCOUNTER — HOSPITAL ENCOUNTER (OUTPATIENT)
Dept: MAMMOGRAPHY | Facility: IMAGING CENTER | Age: 62
Discharge: HOME | End: 2022-03-08
Payer: COMMERCIAL

## 2022-03-08 VITALS — BODY MASS INDEX: 25.52 KG/M2 | WEIGHT: 130 LBS | HEIGHT: 60 IN

## 2022-03-08 DIAGNOSIS — Z12.31 ENCOUNTER FOR SCREENING MAMMOGRAM FOR BREAST CANCER: ICD-10-CM

## 2022-03-08 PROCEDURE — 77063 BREAST TOMOSYNTHESIS BI: CPT

## 2022-03-08 PROCEDURE — 77067 SCR MAMMO BI INCL CAD: CPT

## 2022-04-07 ENCOUNTER — HOSPITAL ENCOUNTER (OUTPATIENT)
Dept: RADIOLOGY | Facility: HOSPITAL | Age: 62
Discharge: HOME | End: 2022-04-07
Payer: COMMERCIAL

## 2022-04-07 ENCOUNTER — OFFICE VISIT (OUTPATIENT)
Dept: FAMILY MEDICINE CLINIC | Facility: CLINIC | Age: 62
End: 2022-04-07
Payer: COMMERCIAL

## 2022-04-07 VITALS
OXYGEN SATURATION: 97 % | SYSTOLIC BLOOD PRESSURE: 119 MMHG | HEART RATE: 88 BPM | DIASTOLIC BLOOD PRESSURE: 77 MMHG | WEIGHT: 131 LBS | BODY MASS INDEX: 25.72 KG/M2 | HEIGHT: 60 IN | TEMPERATURE: 98.6 F

## 2022-04-07 DIAGNOSIS — M54.2 NECK PAIN: ICD-10-CM

## 2022-04-07 DIAGNOSIS — M54.2 NECK PAIN: Primary | ICD-10-CM

## 2022-04-07 PROCEDURE — 99213 OFFICE O/P EST LOW 20 MIN: CPT | Performed by: PHYSICIAN ASSISTANT

## 2022-04-07 PROCEDURE — 72050 X-RAY EXAM NECK SPINE 4/5VWS: CPT

## 2022-04-07 RX ORDER — CYCLOBENZAPRINE HCL 10 MG
10 TABLET ORAL 3 TIMES DAILY PRN
Qty: 60 TABLET | Refills: 3 | Status: SHIPPED | OUTPATIENT
Start: 2022-04-07

## 2022-04-07 RX ORDER — NAPROXEN 500 MG/1
500 TABLET ORAL 2 TIMES DAILY WITH MEALS
Qty: 60 TABLET | Refills: 3 | Status: SHIPPED | OUTPATIENT
Start: 2022-04-07
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
## 2022-04-13 ENCOUNTER — TELEPHONE (OUTPATIENT)
Dept: FAMILY MEDICINE CLINIC | Facility: CLINIC | Age: 62
End: 2022-04-13

## 2022-08-22 ENCOUNTER — TELEPHONE (OUTPATIENT)
Dept: FAMILY MEDICINE CLINIC | Facility: CLINIC | Age: 62
End: 2022-08-22

## 2022-08-22 DIAGNOSIS — M76.30 IT BAND SYNDROME, UNSPECIFIED LATERALITY: Primary | ICD-10-CM

## 2022-08-22 NOTE — TELEPHONE ENCOUNTER
Pt call and want ordered put in for physical therapy because she hurt her self at the gym she want to go next door she hurt her IT band

## 2022-08-31 ENCOUNTER — EVALUATION (OUTPATIENT)
Dept: PHYSICAL THERAPY | Facility: CLINIC | Age: 62
End: 2022-08-31
Payer: COMMERCIAL

## 2022-08-31 DIAGNOSIS — M76.30 ILIOTIBIAL BAND SYNDROME, UNSPECIFIED LATERALITY: Primary | ICD-10-CM

## 2022-08-31 PROCEDURE — 97161 PT EVAL LOW COMPLEX 20 MIN: CPT | Performed by: PHYSICAL THERAPIST

## 2022-08-31 NOTE — PROGRESS NOTES
PT Evaluation     Today's date: 2022  Patient name: Charley Noel  : 1960  MRN: [id number]  Referring provider: Gerry Ponce DO  Dx:   Encounter Diagnosis     ICD-10-CM    1  Iliotibial band syndrome, unspecified laterality  M76 30             Assessment  Assessment details: Charley Noel is a 58 y o  female who presents with pain, decreased strength, decreased ROM, ambulatory dysfunction and balance dysfunction  Due to these impairments, patient has difficulty performing ADL's, recreational activities, work-related activities, engaging in social activities, ambulation, stair negotiation, lifting/carrying, transfers  Patient's clinical presentation is consistent with their referring diagnosis of Iliotibial band syndrome, unspecified laterality  (primary encounter diagnosis)  Patient has been educated in home exercise program and plan of care  Patient would benefit from skilled physical therapy services to address their aforementioned functional limitations and progress towards prior level of function and independence with home exercise program      Impairments: abnormal gait, abnormal or restricted ROM, activity intolerance, impaired balance, impaired physical strength, lacks appropriate home exercise program, pain with function, weight-bearing intolerance, poor posture  and poor body mechanics  Functional limitations: walk, STS, stair negotiation, squat, gym exercises, driving   Prognosis: good  Prognosis details: + factors: high motivation levels  - factors: age    Goals  Short Term Goals to be accomplished by 22  STG1: Pt will be I with HEP  STG2: Pt will perform stair negotiation without use of handrail  STG3: Pt will perform pain free squat  STG4: Pt will return to participating in gym workouts pain free            Plan  Plan details: HEP development, stretching, strengthening, A/AA/PROM, joint mobilizations, posture education, STM/MI as needed to reduce muscle tension, muscle reeducation, PLOC discussed and agreed upon with patient  Patient would benefit from: PT eval and skilled physical therapy  Planned modality interventions: cryotherapy, thermotherapy: hydrocollator packs and unattended electrical stimulation  Planned therapy interventions: manual therapy, neuromuscular re-education, self care, therapeutic activities, therapeutic exercise, home exercise program, patient education, joint mobilization, balance, strengthening, stretching, therapeutic training and flexibility  Frequency: 2x week  Duration in weeks: 12  Plan of Care beginning date: 2022  Plan of Care expiration date: 2022  Treatment plan discussed with: patient    Subjective Evaluation    History of Present Illness  Mechanism of injury: Pt is a 57 y/o female who presents to PT with R/L ITB pain  Pain began about a month ago when she was at the gym doing a groin exercise  Then noted that she general return fully with rest, however re injured it doing kettle bell squats  Called her PCP who prescribed PT  Pain  Current pain ratin  At best pain ratin  At worst pain ratin  Location: R ITB   Quality: sharp, tight, discomfort, pulling and pressure  Relieving factors: rest and relaxation  Aggravating factors: stair climbing, walking, lifting and standing    Treatments  Current treatment: physical therapy  Patient Goals  Patient goals for therapy: increased motion, improved balance, decreased pain, increased strength, independence with ADLs/IADLs, return to sport/leisure activities and return to work  Patient goal: walk, STS, stair negotiation, squat, gym exercises, driving    Objective     Observations     Additional Observation Details  normal and       Tenderness     Additional Tenderness Details  TTP over R hip flexor mass, ITB, TFL, adductor proximal    Active Range of Motion   Left Hip   Flexion: WFL  Abduction: WFL  Adduction: WFL    Right Hip   Flexion: WFL and with pain  Abduction: WFL and with pain  Adduction: WFL and with pain    Strength/Myotome Testing     Left Hip   Planes of Motion   Flexion: 5  Abduction: 4  Adduction: 4    Right Hip   Planes of Motion   Flexion: 4-  Abduction: 3-  Adduction: 3-    Tests     Right Hip   Positive GANESH, FADIR and piriformis       Additional Tests Details  Falls active compression test: (-) on R and left     SLS: NOT TESTED TODAY  R: sec  L: sec     Precautions:       Manuals 8/31        STM R ITB, TFL, prox adductor         PROM R hip stretch                           Neuro Re-Ed 8/31                                                                       Ther Ex 8/31        SLR         S/l hip abd 10x         S/l clamshell O 20x        supine 3 way HS stretch strap  :30x        S/l hip adduction 10x                                    Ther Activity 8/31        Step ups         Lat step down         Forward step down         U/l LP                   Modalities 8/31

## 2022-09-01 PROCEDURE — 97161 PT EVAL LOW COMPLEX 20 MIN: CPT | Performed by: PHYSICAL THERAPIST

## 2022-09-12 ENCOUNTER — OFFICE VISIT (OUTPATIENT)
Dept: PHYSICAL THERAPY | Facility: CLINIC | Age: 62
End: 2022-09-12

## 2022-09-12 DIAGNOSIS — M76.30 ILIOTIBIAL BAND SYNDROME, UNSPECIFIED LATERALITY: Primary | ICD-10-CM

## 2022-09-12 PROCEDURE — 97140 MANUAL THERAPY 1/> REGIONS: CPT | Performed by: PHYSICAL THERAPIST

## 2022-09-12 PROCEDURE — 97110 THERAPEUTIC EXERCISES: CPT | Performed by: PHYSICAL THERAPIST

## 2022-09-12 NOTE — PROGRESS NOTES
Daily Note     Today's date: 2022  Patient name: Adryan Wolfe  : 1960  MRN: [id number]  Referring provider: Kingsley Morris DO  Dx:   Encounter Diagnosis     ICD-10-CM    1  Iliotibial band syndrome, unspecified laterality  M76 30           Subjective: Pt noted that she has been doing her HEP as prescribed  Stated that she has been improving with her pain significantly  Objective: See treatment diary below    Assessment: Tolerated treatment well  Patient demonstrated fatigue post treatment, exhibited good technique with therapeutic exercises and would benefit from continued PT  Plan: Continue per plan of care        Precautions:       Manuals        STM R ITB, TFL, prox adductor  JZ       PROM R hip stretch  JZ       Supine, hip extended off table, knee flexion, then extend knee and hip flex  JZ MRE                Neuro Re-Ed                                                                       Ther Ex        SLR         S/l hip abd 10x  2x10        S/l clamshell O 20x        supine 3 way HS stretch strap  :30x        S/l hip adduction 10x         Slider lunge sagittal  2x10 ea       Lateral slider lunge  2x10 ea       Lateral slider with weight shift  x10 ea                Ther Activity        Step ups         Lat step down         Forward step down         U/l LP                   Modalities

## 2022-09-22 ENCOUNTER — OFFICE VISIT (OUTPATIENT)
Dept: PHYSICAL THERAPY | Facility: CLINIC | Age: 62
End: 2022-09-22

## 2022-09-22 DIAGNOSIS — M76.30 ILIOTIBIAL BAND SYNDROME, UNSPECIFIED LATERALITY: Primary | ICD-10-CM

## 2022-09-22 PROCEDURE — 97140 MANUAL THERAPY 1/> REGIONS: CPT | Performed by: PHYSICAL THERAPIST

## 2022-09-22 PROCEDURE — 97110 THERAPEUTIC EXERCISES: CPT | Performed by: PHYSICAL THERAPIST

## 2022-09-22 NOTE — PROGRESS NOTES
Daily Note     Today's date: 2022  Patient name: Swathi Bal  : 1960  MRN: [id number]  Referring provider: Hakeem Medellin DO  Dx:   Encounter Diagnosis     ICD-10-CM    1  Iliotibial band syndrome, unspecified laterality  M76 30           Subjective: Pt noted that she is still having pain and difficulty with march with trunk flexion  Objective: See treatment diary below    Assessment: Tolerated treatment well  Patient demonstrated fatigue post treatment, exhibited good technique with therapeutic exercises and would benefit from continued PT  Plan: Continue per plan of care        Precautions:       Manuals       STM R ITB, TFL, prox adductor  JZ       PROM R hip stretch  JZ       Supine, hip extended off table, knee flexion, then extend knee and hip flex  JZ MRE JZ MRE      MRE hip add supine   JZ      MRE hip abd supine   JZ                                          Ther Ex       SLR         S/l hip abd 10x  2x10        S/l clamshell O 20x        supine 3 way HS stretch strap  :30x        S/l hip adduction 10x         Slider lunge sagittal  2x10 ea       Lateral slider lunge  2x10 ea       Lateral slider with weight shift  x10 ea       BRAN   2x10       Pallof press    10/ 2x10 ea      Step ups                  Reviewed and updated HEP                  B/l straight leg RDL   8# 2x10       Side Plank   :30x3 ea

## 2022-09-27 ENCOUNTER — OFFICE VISIT (OUTPATIENT)
Dept: PHYSICAL THERAPY | Facility: CLINIC | Age: 62
End: 2022-09-27

## 2022-09-27 DIAGNOSIS — M76.30 ILIOTIBIAL BAND SYNDROME, UNSPECIFIED LATERALITY: Primary | ICD-10-CM

## 2022-09-27 PROCEDURE — 97140 MANUAL THERAPY 1/> REGIONS: CPT | Performed by: PHYSICAL THERAPIST

## 2022-09-27 PROCEDURE — 97110 THERAPEUTIC EXERCISES: CPT | Performed by: PHYSICAL THERAPIST

## 2022-09-27 NOTE — PROGRESS NOTES
Daily Note     Today's date: 2022  Patient name: Bishop Crooks  : 1960  MRN: [id number]  Referring provider: Rob Salamanca DO  Dx:   Encounter Diagnosis     ICD-10-CM    1  Iliotibial band syndrome, unspecified laterality  M76 30           Subjective: Pt reported that she did her HEP 3 times since her last visit  Noted "I have some good days and I have some bad days "     Objective: See treatment diary below    Assessment: Tolerated treatment well  Patient demonstrated fatigue post treatment, exhibited good technique with therapeutic exercises and would benefit from continued PT  Plan: Continue per plan of care        Precautions:       Manuals      STM R ITB, TFL, prox adductor  JZ       PROM R hip stretch  JZ       Supine, hip extended off table, knee flexion, then extend knee and hip flex  JZ MRE JZ MRE JZ MRE     MRE hip add supine   JZ JZ     MRE hip abd supine   JZ JZ                                         Ther Ex      SLR         S/l hip abd 10x  2x10        S/l clamshell O 20x        supine 3 way HS stretch strap  :30x        S/l hip adduction 10x         Slider lunge sagittal  2x10 ea       Lateral slider lunge  2x10 ea       Lateral slider with weight shift  x10 ea       BRAN   2x10       Pallof press    10/ 2x10 ea      Step ups         [place] resisted march, start in hip extended position    11/ 3x10      Reviewed and updated HEP         U/l partial squat    2x10 ea     B/l straight leg RDL   8# 2x10       Side Plank   :30x3 ea      Stand march from trunk flexed position, 2hands on counter    3x20 ea

## 2022-10-06 ENCOUNTER — APPOINTMENT (OUTPATIENT)
Dept: PHYSICAL THERAPY | Facility: CLINIC | Age: 62
End: 2022-10-06

## 2022-10-13 ENCOUNTER — OFFICE VISIT (OUTPATIENT)
Dept: PHYSICAL THERAPY | Facility: CLINIC | Age: 62
End: 2022-10-13

## 2022-10-13 DIAGNOSIS — M76.30 ILIOTIBIAL BAND SYNDROME, UNSPECIFIED LATERALITY: Primary | ICD-10-CM

## 2022-10-13 PROCEDURE — 97110 THERAPEUTIC EXERCISES: CPT | Performed by: PHYSICAL THERAPIST

## 2022-10-13 PROCEDURE — 97140 MANUAL THERAPY 1/> REGIONS: CPT | Performed by: PHYSICAL THERAPIST

## 2022-10-13 NOTE — PROGRESS NOTES
Daily Note     Today's date: 10/13/2022  Patient name: Sandra Rojas  : 1960  MRN: [id number]  Referring provider: Marcia Lay DO  Dx:   Encounter Diagnosis     ICD-10-CM    1  Iliotibial band syndrome, unspecified laterality  M76 30           Subjective: Pt noted that she continues to perform her HEP inconsistently at home  Objective: See treatment diary below    Assessment: Tolerated treatment well  Patient demonstrated fatigue post treatment, exhibited good technique with therapeutic exercises and would benefit from continued PT  Plan: Continue per plan of care        Precautions:       Manuals 8/31 9/12 9/22 9/27 10/13    STM R ITB, TFL, prox adductor  JZ       PROM R hip stretch  JZ       Supine, hip extended off table, knee flexion, then extend knee and hip flex  JZ MRE JZ MRE JZ MRE JZ    MRE hip add supine   JZ JZ JZ    MRE hip abd supine   JZ JZ JZ                                        Ther Ex 8/31 9/12 9/22 9/27 10/13    Slider lunge sagittal  2x10 ea       Lateral slider lunge  2x10 ea       Lateral slider with weight shift  x10 ea       Pallof press    10/ 2x10 ea      [place] resisted march, start in hip extended position    11/ 3x10  14/ 3x10     Reviewed and updated HEP         U/l partial squat    2x10 ea     Side Plank   :30x3 ea      Stand march from trunk flexed position, 2hands on counter    3x20 ea     Pressure cuff abdominal brace     :30x3   mmHg    Pressure cuff 90 90     :30x3    mmHg    Pressure cuff supine march     2x5 ea    mmHg     Pressure

## 2022-10-18 ENCOUNTER — APPOINTMENT (OUTPATIENT)
Dept: PHYSICAL THERAPY | Facility: CLINIC | Age: 62
End: 2022-10-18

## 2022-10-18 NOTE — PROGRESS NOTES
Pt has self d/c'd from PT   Stated that she has returned to gym workouts and no longer requires skilled outpatient PT

## 2022-10-25 ENCOUNTER — APPOINTMENT (OUTPATIENT)
Dept: PHYSICAL THERAPY | Facility: CLINIC | Age: 62
End: 2022-10-25

## 2023-02-02 ENCOUNTER — TELEPHONE (OUTPATIENT)
Dept: PHYSICAL THERAPY | Facility: CLINIC | Age: 63
End: 2023-02-02

## 2023-02-02 NOTE — TELEPHONE ENCOUNTER
This morning I reached out to patient to Let her know that Sharlene Schroeder from billing reached out to me to clarify that patient used insurance for evaluation on 8/31/22 and the remainder of the appointments were Self Pay  I explained that she is responsible for the balance for DOS 8/31/22 that the insurance company will not pay

## 2023-02-14 ENCOUNTER — OFFICE VISIT (OUTPATIENT)
Dept: FAMILY MEDICINE CLINIC | Facility: CLINIC | Age: 63
End: 2023-02-14

## 2023-02-14 VITALS
SYSTOLIC BLOOD PRESSURE: 122 MMHG | DIASTOLIC BLOOD PRESSURE: 76 MMHG | OXYGEN SATURATION: 96 % | HEIGHT: 60 IN | TEMPERATURE: 98.5 F | WEIGHT: 137 LBS | HEART RATE: 74 BPM | BODY MASS INDEX: 26.9 KG/M2

## 2023-02-14 DIAGNOSIS — Z13.0 SCREENING FOR ENDOCRINE, METABOLIC AND IMMUNITY DISORDER: ICD-10-CM

## 2023-02-14 DIAGNOSIS — Z13.220 SCREENING FOR LIPID DISORDERS: ICD-10-CM

## 2023-02-14 DIAGNOSIS — Z13.29 SCREENING FOR ENDOCRINE, METABOLIC AND IMMUNITY DISORDER: ICD-10-CM

## 2023-02-14 DIAGNOSIS — R10.30 LOWER ABDOMINAL PAIN: ICD-10-CM

## 2023-02-14 DIAGNOSIS — Z13.0 SCREENING FOR DEFICIENCY ANEMIA: ICD-10-CM

## 2023-02-14 DIAGNOSIS — Z13.228 SCREENING FOR ENDOCRINE, METABOLIC AND IMMUNITY DISORDER: ICD-10-CM

## 2023-02-14 DIAGNOSIS — Z13.1 SCREENING FOR DIABETES MELLITUS (DM): ICD-10-CM

## 2023-02-14 DIAGNOSIS — Z12.31 ENCOUNTER FOR SCREENING MAMMOGRAM FOR BREAST CANCER: ICD-10-CM

## 2023-02-14 DIAGNOSIS — R10.2 PELVIC PAIN: Primary | ICD-10-CM

## 2023-02-14 LAB
SL AMB  POCT GLUCOSE, UA: NORMAL
SL AMB LEUKOCYTE ESTERASE,UA: NORMAL
SL AMB POCT BILIRUBIN,UA: NORMAL
SL AMB POCT BLOOD,UA: NORMAL
SL AMB POCT CLARITY,UA: CLEAR
SL AMB POCT COLOR,UA: YELLOW
SL AMB POCT KETONES,UA: NORMAL
SL AMB POCT NITRITE,UA: NORMAL
SL AMB POCT PH,UA: 7
SL AMB POCT SPECIFIC GRAVITY,UA: 1.02
SL AMB POCT URINE PROTEIN: NORMAL
SL AMB POCT UROBILINOGEN: NORMAL

## 2023-02-14 NOTE — PROGRESS NOTES
Assessment/Plan:      1  Pelvic pain  Assessment & Plan:  Suprapubic - no signs of urinary tract infection  Possible abdominal muscle strain  Will take ibuprofen 200mg as needed  Consider pelvic ultrasound if symptoms ongoing or worsening  Orders:  -     US pelvis complete non OB; Future; Expected date: 02/14/2023    2  Screening for deficiency anemia  -     CBC and differential    3  Screening for diabetes mellitus (DM)  -     Comprehensive metabolic panel    4  Screening for endocrine, metabolic and immunity disorder  -     Comprehensive metabolic panel  -     TSH, 3rd generation with Free T4 reflex    5  Screening for lipid disorders  -     Lipid Panel with Direct LDL reflex    6  Encounter for screening mammogram for breast cancer  -     Mammo screening bilateral w 3d & cad        Subjective:  Chief Complaint   Patient presents with   • Follow-up     Abdominal pain-lower abdominal near hips  painful to walk at times  Pain triggered by stepping on the right foot and applying pressure  Noticed on Saturday; Pt mentions weight training every 2 wks, possible strain  Stabbing pain  Not taking any OTC for pain  Pt mentions small lump on right upper arm, sometimes bothers/itches and sometimes does not feel it just wants it checked out  Pain is 2/10  Affecting her walk  Pt advised to [facility]  BW pended  Pt unsure if colonoscopy covered (self pay)        Patient ID: Fred Harris is a 58 y o  female  Complains of lower abdominal pain since Saturday  Has been working out twice a week with a   Last was on Friday but did not notice any injury  Complains of pain over lower abdomen, now localized to center  Worse when lifting right leg  Has not noticed any changes in urine, no pain or burning  Urinates frequently but normal for her  Drinks a lot of water  No fever  No back pain  No changes in bms  Post menopausal since age 50  No menstrual bleeding  Has appt with gyn next week    Pt insurance does not cover very much       Review of Systems   Constitutional: Negative  Negative for fatigue and fever  HENT: Negative  Eyes: Negative  Respiratory: Negative  Negative for cough  Cardiovascular: Negative  Gastrointestinal: Positive for abdominal pain  Negative for constipation, diarrhea, nausea and vomiting  Endocrine: Negative  Genitourinary: Positive for frequency and pelvic pain  Negative for dysuria, flank pain, hematuria, menstrual problem, urgency, vaginal bleeding, vaginal discharge and vaginal pain  Musculoskeletal: Negative  Skin: Negative  Allergic/Immunologic: Negative  Neurological: Negative  Psychiatric/Behavioral: Negative  The following portions of the patient's history were reviewed and updated as appropriate: allergies, current medications, past family history, past medical history, past social history, past surgical history and problem list     Objective:  Vitals:    02/14/23 1112   BP: 122/76   Pulse: 74   Temp: 98 5 °F (36 9 °C)   SpO2: 96%   Weight: 62 1 kg (137 lb)   Height: 5' (1 524 m)      Physical Exam  Vitals and nursing note reviewed  Constitutional:       Appearance: She is well-developed  HENT:      Head: Normocephalic and atraumatic  Cardiovascular:      Rate and Rhythm: Normal rate and regular rhythm  Heart sounds: Normal heart sounds  Pulmonary:      Effort: Pulmonary effort is normal       Breath sounds: Normal breath sounds  Abdominal:      General: Bowel sounds are normal       Palpations: Abdomen is soft  Tenderness: There is abdominal tenderness  Comments: Tenderness suprapubic mild    Musculoskeletal:         General: No tenderness  Comments: No tenderness to palpation of groin  Straight leg raise negative  No cva tenderness   Skin:     General: Skin is warm and dry  Neurological:      Mental Status: She is alert and oriented to person, place, and time     Psychiatric:         Behavior: Behavior normal          Thought Content:  Thought content normal          Judgment: Judgment normal

## 2023-02-14 NOTE — ASSESSMENT & PLAN NOTE
Suprapubic - no signs of urinary tract infection  Possible abdominal muscle strain  Will take ibuprofen 200mg as needed  Consider pelvic ultrasound if symptoms ongoing or worsening

## 2023-02-20 ENCOUNTER — TELEPHONE (OUTPATIENT)
Dept: FAMILY MEDICINE CLINIC | Facility: CLINIC | Age: 63
End: 2023-02-20

## 2023-02-20 NOTE — TELEPHONE ENCOUNTER
----- Message from Keira Seals DO sent at 2/19/2023 11:28 PM EST -----  Regarding: urine  Your urine test is normal  ----- Message -----  From: Chelita Amezcua  Sent: 2/16/2023   8:37 AM EST  To: Keira Seals DO

## 2023-02-21 ENCOUNTER — ANNUAL EXAM (OUTPATIENT)
Dept: GYNECOLOGY | Facility: CLINIC | Age: 63
End: 2023-02-21

## 2023-02-21 VITALS
BODY MASS INDEX: 26.5 KG/M2 | HEIGHT: 60 IN | DIASTOLIC BLOOD PRESSURE: 72 MMHG | SYSTOLIC BLOOD PRESSURE: 116 MMHG | WEIGHT: 135 LBS

## 2023-02-21 DIAGNOSIS — R29.890 LOSS OF HEIGHT: ICD-10-CM

## 2023-02-21 DIAGNOSIS — Z12.31 ENCOUNTER FOR SCREENING MAMMOGRAM FOR BREAST CANCER: ICD-10-CM

## 2023-02-21 DIAGNOSIS — Z01.419 ENCOUNTER FOR ANNUAL ROUTINE GYNECOLOGICAL EXAMINATION: Primary | ICD-10-CM

## 2023-02-21 NOTE — PROGRESS NOTES
Assessment/Plan:    pap is up to date    mammogram reviewed with her including breast density  RX given and she has this scheduled     Discussed self breast exams    colon cancer screening - pt is due this year for colonoscopy, she is very reluctant to have this because her father passed away after an EGD  He was 80years old  Loss of height-we discussed bone density, DEXA ordered and she is aware to check on coverage  We discussed adequate calcium and vitamin D and she was given information on this  She is exercising regularly  discussed preventive care, regular exercise and a healthy diet      No problem-specific Assessment & Plan notes found for this encounter  Diagnoses and all orders for this visit:    Encounter for annual routine gynecological examination    Encounter for screening mammogram for breast cancer  -     Mammo screening bilateral w 3d & cad; Future    Loss of height  -     DXA bone density spine hip and pelvis; Future          Subjective:      Patient ID: Erin Schroeder is a 58 y o  female  Pt here for yearly  Last week, she was exercising and then started having lower abdominal pain on Saturday  It was in the midline  She saw her family doctor and had a normal urinalysis  An ultrasound was ordered but she was told that she did not have to get it done if her pain resolved  This has resolved  She has lost 2 inches of height  She has questions about her bone density  Her mother has osteoporosis  Normal Pap, negative HPV in February 2022  Normal 3D mammogram last March with scattered fibroglandular densities and average risk  She has this scheduled          The following portions of the patient's history were reviewed and updated as appropriate: allergies, current medications, past family history, past medical history, past social history, past surgical history and problem list     Review of Systems   Constitutional: Negative  Gastrointestinal: Negative  Genitourinary: Negative  Objective:      LMP  (LMP Unknown)          Physical Exam  Vitals reviewed  Constitutional:       Appearance: She is well-developed  Neck:      Thyroid: No thyromegaly  Trachea: No tracheal deviation  Cardiovascular:      Rate and Rhythm: Normal rate and regular rhythm  Pulmonary:      Effort: Pulmonary effort is normal       Breath sounds: Normal breath sounds  Chest:   Breasts:     Breasts are symmetrical       Right: No inverted nipple, mass, nipple discharge, skin change or tenderness  Left: No inverted nipple, mass, nipple discharge, skin change or tenderness  Abdominal:      General: There is no distension  Palpations: Abdomen is soft  There is no mass  Tenderness: There is no abdominal tenderness  Genitourinary:     Labia:         Right: No rash, tenderness, lesion or injury  Left: No rash, tenderness, lesion or injury  Vagina: Normal       Cervix: No cervical motion tenderness, discharge or friability  Adnexa:         Right: No mass, tenderness or fullness  Left: No mass, tenderness or fullness          Rectum: Normal

## 2023-03-20 ENCOUNTER — OFFICE VISIT (OUTPATIENT)
Dept: FAMILY MEDICINE CLINIC | Facility: CLINIC | Age: 63
End: 2023-03-20

## 2023-03-20 VITALS
BODY MASS INDEX: 26.86 KG/M2 | HEIGHT: 60 IN | HEART RATE: 98 BPM | SYSTOLIC BLOOD PRESSURE: 112 MMHG | OXYGEN SATURATION: 96 % | WEIGHT: 136.8 LBS | TEMPERATURE: 100.8 F | DIASTOLIC BLOOD PRESSURE: 74 MMHG

## 2023-03-20 DIAGNOSIS — R05.9 COUGH, UNSPECIFIED TYPE: ICD-10-CM

## 2023-03-20 DIAGNOSIS — J40 TRACHEOBRONCHITIS: Primary | ICD-10-CM

## 2023-03-20 PROBLEM — I83.10 VARICOSE VEINS OF LOWER EXTREMITY WITH INFLAMMATION: Status: ACTIVE | Noted: 2020-03-09

## 2023-03-20 RX ORDER — BENZONATATE 200 MG/1
200 CAPSULE ORAL 3 TIMES DAILY PRN
COMMUNITY
Start: 2023-03-16 | End: 2023-03-23

## 2023-03-20 RX ORDER — ALBUTEROL SULFATE 90 UG/1
2 AEROSOL, METERED RESPIRATORY (INHALATION) EVERY 6 HOURS PRN
Qty: 6.7 G | Refills: 0 | Status: SHIPPED | OUTPATIENT
Start: 2023-03-20

## 2023-03-20 NOTE — PATIENT INSTRUCTIONS
Prednisone 10mg 4 tabs for 2 days, 3 tabs for 2 days, 2 tabs for 2 days, 1 tabs for 2 days  Albuterol 2 puffs every 4 hours as needed   Fluids

## 2023-03-20 NOTE — PROGRESS NOTES
Assessment/Plan:      1  Tracheobronchitis  Assessment & Plan:  pred taper and renewal of albuterol inhaler    Orders:  -     albuterol (Proventil HFA) 90 mcg/act inhaler; Inhale 2 puffs every 6 (six) hours as needed for wheezing  -     Covid/Flu- Office Collect    2  Cough, unspecified type  -     Covid/Flu- Office Collect        Subjective:  Chief Complaint   Patient presents with   • Follow-up     Cough  Traveled to a wedding [facility] people  Tuesday started with a raw cough  Went to walk in and given tessalon benzonatate and did nothing for cough  Pt feels SOB  Pt brought inhaler incase recommends to try it  Very short breathed from cough  Neck, shoulders sore from coughing, headache  At home COVID test not taken  Thursday at walk-in did not have fever  Slight chills  Today in office had fever of 100 8 °F        Patient ID: Jerry Hightower is a 58 y o  female  Pt complains of a Cough started last week, Tuesday  Feverish feeling   Tight and dry cough  Low grade temp  Was at a [facility] in [place] [address] last weekend  , out with friends at a bar  Complains of a Headache- intermittent from coughing  No ear pain,       Review of Systems   Constitutional: Positive for fatigue and fever  HENT: Negative  Eyes: Negative  Respiratory: Positive for cough and shortness of breath  Cardiovascular: Negative  Gastrointestinal: Negative  Endocrine: Negative  Genitourinary: Negative  Musculoskeletal: Negative  Skin: Negative  Allergic/Immunologic: Negative  Neurological: Negative  Psychiatric/Behavioral: Negative            The following portions of the patient's history were reviewed and updated as appropriate: allergies, current medications, past family history, past medical history, past social history, past surgical history and problem list     Objective:  Vitals:    03/20/23 1414   BP: 112/74   Pulse: 98   Temp: (!) 100 8 °F (38 2 °C)   SpO2: 96%   Weight: 62 1 kg (136 lb 12 8 oz)   Height: 5' (1 524 m)      Physical Exam  Vitals and nursing note reviewed  Constitutional:       Appearance: She is well-developed  HENT:      Head: Normocephalic and atraumatic  Right Ear: Tympanic membrane normal       Left Ear: Tympanic membrane normal    Cardiovascular:      Rate and Rhythm: Normal rate and regular rhythm  Heart sounds: Normal heart sounds  Pulmonary:      Effort: Pulmonary effort is normal       Breath sounds: Wheezing present  Abdominal:      General: Bowel sounds are normal       Palpations: Abdomen is soft  Skin:     General: Skin is warm and dry  Neurological:      Mental Status: She is alert and oriented to person, place, and time  Psychiatric:         Behavior: Behavior normal          Thought Content:  Thought content normal          Judgment: Judgment normal

## 2023-03-21 ENCOUNTER — TELEPHONE (OUTPATIENT)
Dept: FAMILY MEDICINE CLINIC | Facility: CLINIC | Age: 63
End: 2023-03-21

## 2023-03-21 LAB
FLUAV RNA RESP QL NAA+PROBE: NEGATIVE
FLUBV RNA RESP QL NAA+PROBE: NEGATIVE
SARS-COV-2 RNA RESP QL NAA+PROBE: NEGATIVE

## 2023-03-21 NOTE — TELEPHONE ENCOUNTER
How are you feeling? Pt says, Last night had a bad night, worried abt fever, woke up soaking wet, had to take inhaler but hopefully slowly improving.  Feeling better today, able to breathe better

## 2023-03-21 NOTE — TELEPHONE ENCOUNTER
----- Message from Catrina Wright DO sent at 3/21/2023 12:34 PM EDT -----  Your covid and flu tests are both negative. How are you feeling?

## 2023-04-24 ENCOUNTER — HOSPITAL ENCOUNTER (OUTPATIENT)
Dept: ULTRASOUND IMAGING | Facility: CLINIC | Age: 63
Discharge: HOME | End: 2023-04-24

## 2023-04-24 DIAGNOSIS — R92.8 ABNORMAL SCREENING MAMMOGRAM: ICD-10-CM

## 2023-04-26 ENCOUNTER — TELEPHONE (OUTPATIENT)
Dept: FAMILY MEDICINE CLINIC | Facility: CLINIC | Age: 63
End: 2023-04-26

## 2023-04-26 NOTE — TELEPHONE ENCOUNTER
Patient called in wanting Dr Redding's to give her a personal call to straighten out the US results  She states radiology told her everything was normal but we're telling her there's a benign  Patient declined VV to discuss because she's self pay and doesn't see why she would need to pay for a VV  Patient's aware the doctor's don't do personal calls but would like a message relayed over to Dr Redding's because she doesn't see why the doctor won't just call her she states

## 2023-04-26 NOTE — TELEPHONE ENCOUNTER
----- Message from Estella Singh DO sent at 4/25/2023  7:38 PM EDT -----  No mychart  The area in the left breast appears benign  Repeat a diagnostic mammogram in 6 months for the left breast to check for any changes

## 2023-06-16 ENCOUNTER — OFFICE VISIT (OUTPATIENT)
Dept: URGENT CARE | Facility: CLINIC | Age: 63
End: 2023-06-16
Payer: COMMERCIAL

## 2023-06-16 VITALS
SYSTOLIC BLOOD PRESSURE: 115 MMHG | HEART RATE: 94 BPM | OXYGEN SATURATION: 95 % | RESPIRATION RATE: 16 BRPM | BODY MASS INDEX: 26.56 KG/M2 | DIASTOLIC BLOOD PRESSURE: 81 MMHG | WEIGHT: 136 LBS | TEMPERATURE: 98.3 F

## 2023-06-16 DIAGNOSIS — J01.10 ACUTE NON-RECURRENT FRONTAL SINUSITIS: Primary | ICD-10-CM

## 2023-06-16 PROCEDURE — G0382 LEV 3 HOSP TYPE B ED VISIT: HCPCS | Performed by: FAMILY MEDICINE

## 2023-06-16 RX ORDER — AZITHROMYCIN 250 MG/1
TABLET, FILM COATED ORAL
Qty: 6 TABLET | Refills: 0 | Status: SHIPPED | OUTPATIENT
Start: 2023-06-16 | End: 2023-06-20

## 2023-06-16 RX ORDER — METHYLPREDNISOLONE 4 MG/1
TABLET ORAL
Qty: 21 TABLET | Refills: 0 | Status: SHIPPED | OUTPATIENT
Start: 2023-06-16

## 2023-06-16 NOTE — PROGRESS NOTES
[facility]        NAME: Dina Langley is a 61 y o  female  : 1960    MRN: [id number]  DATE: 2023  TIME: 9:03 AM    Assessment and Plan   Acute non-recurrent frontal sinusitis [J01 10]  1  Acute non-recurrent frontal sinusitis  azithromycin (ZITHROMAX) 250 mg tablet    methylPREDNISolone 4 MG tablet therapy pack            Patient Instructions       Follow up with PCP in 3-5 days  Proceed to  ER if symptoms worsen  Chief Complaint     Chief Complaint   Patient presents with   • Cough     Pt has had a productive cough, runny nose, sinus pressure for the last week  Cough has been dry for the last 2 days  History of Present Illness       HPI    Review of Systems   Review of Systems   Constitutional: Negative  HENT: Positive for sinus pressure  Eyes: Negative  Respiratory: Positive for cough  Cardiovascular: Negative  Gastrointestinal: Negative  Genitourinary: Negative  Skin: Negative  Allergic/Immunologic: Negative  Neurological: Negative  Hematological: Negative  Psychiatric/Behavioral: Negative            Current Medications       Current Outpatient Medications:   •  azithromycin (ZITHROMAX) 250 mg tablet, Take 2 tablets today then 1 tablet daily x 4 days, Disp: 6 tablet, Rfl: 0  •  methylPREDNISolone 4 MG tablet therapy pack, Use as directed on package, Disp: 21 tablet, Rfl: 0  •  albuterol (Proventil HFA) 90 mcg/act inhaler, Inhale 2 puffs every 6 (six) hours as needed for wheezing, Disp: 6 7 g, Rfl: 0  •  CALCIUM PO, Take by mouth, Disp: , Rfl:   •  fish oil-omega-3 fatty acids 1000 MG capsule, Take by mouth if needed, Disp: , Rfl:   •  MAGNESIUM PO, Take by mouth, Disp: , Rfl:   •  Multiple Vitamins-Minerals (IMMUNE SUPPORT PO), Take by mouth, Disp: , Rfl:   •  Probiotic Product (PROBIOTIC DAILY PO), Take by mouth, Disp: , Rfl:     Current Allergies     Allergies as of 2023 - Reviewed 2023   Allergen Reaction Noted   • Levaquin [levofloxacin]  08/13/2018            The following portions of the patient's history were reviewed and updated as appropriate: allergies, current medications, past family history, past medical history, past social history, past surgical history and problem list      No past medical history on file  Past Surgical History:   Procedure Laterality Date   • APPENDECTOMY     • KNEE SURGERY Right     tendon       Family History   Problem Relation Age of Onset   • Osteoporosis Mother    • Glaucoma Mother    • Hypertension Father    • Hyperlipidemia Father    • Glaucoma Father    • Ulcers Father    • No Known Problems Sister    • No Known Problems Daughter    • No Known Problems Maternal Grandmother    • No Known Problems Maternal Grandfather    • No Known Problems Paternal Grandmother    • No Known Problems Paternal Grandfather    • Lung cancer Maternal Aunt 46   • No Known Problems Maternal Aunt    • No Known Problems Paternal Aunt          Medications have been verified  Objective   /81   Pulse 94   Temp 98 3 °F (36 8 °C) (Oral)   Resp 16   Wt 61 7 kg (136 lb)   LMP  (LMP Unknown)   SpO2 95%   BMI 26 56 kg/m²   No LMP recorded (lmp unknown)  Patient is postmenopausal        Physical Exam     Physical Exam  Vitals and nursing note reviewed  Constitutional:       Appearance: She is well-developed  HENT:      Head: Normocephalic  Nose: Congestion present  Eyes:      Pupils: Pupils are equal, round, and reactive to light  Cardiovascular:      Rate and Rhythm: Normal rate and regular rhythm  Pulmonary:      Effort: Pulmonary effort is normal    Musculoskeletal:         General: Normal range of motion  Skin:     General: Skin is warm and dry  Neurological:      Mental Status: She is alert and oriented to person, place, and time

## 2023-06-22 ENCOUNTER — HOSPITAL ENCOUNTER (EMERGENCY)
Facility: HOSPITAL | Age: 63
Discharge: HOME | End: 2023-06-23
Attending: EMERGENCY MEDICINE
Payer: COMMERCIAL

## 2023-06-22 ENCOUNTER — APPOINTMENT (OUTPATIENT)
Dept: RADIOLOGY | Facility: HOSPITAL | Age: 63
End: 2023-06-22
Payer: COMMERCIAL

## 2023-06-22 VITALS
DIASTOLIC BLOOD PRESSURE: 99 MMHG | OXYGEN SATURATION: 95 % | RESPIRATION RATE: 20 BRPM | SYSTOLIC BLOOD PRESSURE: 135 MMHG | BODY MASS INDEX: 26.56 KG/M2 | TEMPERATURE: 98.2 F | WEIGHT: 136 LBS | HEART RATE: 89 BPM

## 2023-06-22 DIAGNOSIS — R05.9 COUGH: Primary | ICD-10-CM

## 2023-06-22 DIAGNOSIS — J20.9 ACUTE BRONCHITIS: ICD-10-CM

## 2023-06-22 LAB
ALBUMIN SERPL BCP-MCNC: 4.1 G/DL (ref 3.5–5)
ALP SERPL-CCNC: 60 U/L (ref 34–104)
ALT SERPL W P-5'-P-CCNC: 16 U/L (ref 7–52)
ANION GAP SERPL CALCULATED.3IONS-SCNC: 7 MMOL/L
AST SERPL W P-5'-P-CCNC: 17 U/L (ref 13–39)
BASOPHILS # BLD AUTO: 0.04 THOUSANDS/ÂΜL (ref 0–0.1)
BASOPHILS NFR BLD AUTO: 0 % (ref 0–1)
BILIRUB SERPL-MCNC: 0.26 MG/DL (ref 0.2–1)
BUN SERPL-MCNC: 18 MG/DL (ref 5–25)
CALCIUM SERPL-MCNC: 9.3 MG/DL (ref 8.4–10.2)
CARDIAC TROPONIN I PNL SERPL HS: 3 NG/L
CHLORIDE SERPL-SCNC: 104 MMOL/L (ref 96–108)
CO2 SERPL-SCNC: 27 MMOL/L (ref 21–32)
CREAT SERPL-MCNC: 0.89 MG/DL (ref 0.6–1.3)
D DIMER PPP FEU-MCNC: 0.29 UG/ML FEU
EOSINOPHIL # BLD AUTO: 0.06 THOUSAND/ÂΜL (ref 0–0.61)
EOSINOPHIL NFR BLD AUTO: 1 % (ref 0–6)
ERYTHROCYTE [DISTWIDTH] IN BLOOD BY AUTOMATED COUNT: 13.3 % (ref 11.6–15.1)
FLUAV RNA RESP QL NAA+PROBE: NEGATIVE
FLUBV RNA RESP QL NAA+PROBE: NEGATIVE
GFR SERPL CREATININE-BSD FRML MDRD: 69 ML/MIN/1.73SQ M
GLUCOSE SERPL-MCNC: 121 MG/DL (ref 65–140)
HCT VFR BLD AUTO: 36.9 % (ref 34.8–46.1)
HGB BLD-MCNC: 12 G/DL (ref 11.5–15.4)
IMM GRANULOCYTES # BLD AUTO: 0.1 THOUSAND/UL (ref 0–0.2)
IMM GRANULOCYTES NFR BLD AUTO: 1 % (ref 0–2)
LACTATE SERPL-SCNC: 1 MMOL/L (ref 0.5–2)
LYMPHOCYTES # BLD AUTO: 3.91 THOUSANDS/ÂΜL (ref 0.6–4.47)
LYMPHOCYTES NFR BLD AUTO: 31 % (ref 14–44)
MCH RBC QN AUTO: 28.3 PG (ref 26.8–34.3)
MCHC RBC AUTO-ENTMCNC: 32.5 G/DL (ref 31.4–37.4)
MCV RBC AUTO: 87 FL (ref 82–98)
MONOCYTES # BLD AUTO: 0.76 THOUSAND/ÂΜL (ref 0.17–1.22)
MONOCYTES NFR BLD AUTO: 6 % (ref 4–12)
NEUTROPHILS # BLD AUTO: 7.94 THOUSANDS/ÂΜL (ref 1.85–7.62)
NEUTS SEG NFR BLD AUTO: 61 % (ref 43–75)
NRBC BLD AUTO-RTO: 0 /100 WBCS
PLATELET # BLD AUTO: 406 THOUSANDS/UL (ref 149–390)
PMV BLD AUTO: 9.2 FL (ref 8.9–12.7)
POTASSIUM SERPL-SCNC: 3.6 MMOL/L (ref 3.5–5.3)
PROCALCITONIN SERPL-MCNC: <0.05 NG/ML
PROT SERPL-MCNC: 6.7 G/DL (ref 6.4–8.4)
RBC # BLD AUTO: 4.24 MILLION/UL (ref 3.81–5.12)
RSV RNA RESP QL NAA+PROBE: NEGATIVE
SARS-COV-2 RNA RESP QL NAA+PROBE: NEGATIVE
SODIUM SERPL-SCNC: 138 MMOL/L (ref 135–147)
WBC # BLD AUTO: 12.81 THOUSAND/UL (ref 4.31–10.16)

## 2023-06-22 PROCEDURE — 84484 ASSAY OF TROPONIN QUANT: CPT | Performed by: PHYSICIAN ASSISTANT

## 2023-06-22 PROCEDURE — 71046 X-RAY EXAM CHEST 2 VIEWS: CPT

## 2023-06-22 PROCEDURE — 83605 ASSAY OF LACTIC ACID: CPT | Performed by: PHYSICIAN ASSISTANT

## 2023-06-22 PROCEDURE — 99284 EMERGENCY DEPT VISIT MOD MDM: CPT

## 2023-06-22 PROCEDURE — 93005 ELECTROCARDIOGRAM TRACING: CPT

## 2023-06-22 PROCEDURE — 84145 PROCALCITONIN (PCT): CPT | Performed by: PHYSICIAN ASSISTANT

## 2023-06-22 PROCEDURE — 85025 COMPLETE CBC W/AUTO DIFF WBC: CPT | Performed by: PHYSICIAN ASSISTANT

## 2023-06-22 PROCEDURE — 0241U HB NFCT DS VIR RESP RNA 4 TRGT: CPT | Performed by: PHYSICIAN ASSISTANT

## 2023-06-22 PROCEDURE — 87040 BLOOD CULTURE FOR BACTERIA: CPT | Performed by: PHYSICIAN ASSISTANT

## 2023-06-22 PROCEDURE — 36415 COLL VENOUS BLD VENIPUNCTURE: CPT | Performed by: PHYSICIAN ASSISTANT

## 2023-06-22 PROCEDURE — 85379 FIBRIN DEGRADATION QUANT: CPT | Performed by: PHYSICIAN ASSISTANT

## 2023-06-22 PROCEDURE — 94640 AIRWAY INHALATION TREATMENT: CPT

## 2023-06-22 PROCEDURE — 80053 COMPREHEN METABOLIC PANEL: CPT | Performed by: PHYSICIAN ASSISTANT

## 2023-06-22 RX ORDER — IPRATROPIUM BROMIDE AND ALBUTEROL SULFATE 2.5; .5 MG/3ML; MG/3ML
3 SOLUTION RESPIRATORY (INHALATION) ONCE
Status: COMPLETED | OUTPATIENT
Start: 2023-06-22 | End: 2023-06-22

## 2023-06-22 RX ADMIN — IPRATROPIUM BROMIDE AND ALBUTEROL SULFATE 3 ML: 2.5; .5 SOLUTION RESPIRATORY (INHALATION) at 23:08

## 2023-06-23 LAB
ATRIAL RATE: 80 BPM
P AXIS: 73 DEGREES
PR INTERVAL: 168 MS
QRS AXIS: 47 DEGREES
QRSD INTERVAL: 84 MS
QT INTERVAL: 370 MS
QTC INTERVAL: 426 MS
T WAVE AXIS: 75 DEGREES
VENTRICULAR RATE: 80 BPM

## 2023-06-23 PROCEDURE — 93010 ELECTROCARDIOGRAM REPORT: CPT | Performed by: INTERNAL MEDICINE

## 2023-06-23 NOTE — DISCHARGE INSTRUCTIONS
Continue prednisone and doxycycline  Follow up with family doctor in 1-2 days for recheck  Return to ER if symptoms worsen  Use inhaler every 4 hours as needed  Take mucinex until your cough is dry

## 2023-06-23 NOTE — ED PROVIDER NOTES
History  Chief Complaint   Patient presents with   • Cough     Cough X 2 weeks was seen at [facility] and [facility] urgent care  Is currently on ABT, steroids, cough medicine and inhaler  Feels the coughing spells keep getting worse  Patient is a 62 y/o F that presents to the ED with cough x 2 weeks  She denies fevers, chills  She states she is coughing up clear mucus  She denies rhinorrhea or sore throat  No sick contact  She was seen by urgent care 2 days ago and prescribed doxycycline, prednisone taper and albuterol  She states she does not feel better  She denies chest pain or SOB, but states she can hear wheezing  History provided by:  Patient  Cough  Cough characteristics:  Productive  Sputum characteristics:  Clear  Severity:  Moderate  Onset quality:  Gradual  Duration:  2 weeks  Timing:  Constant  Progression:  Worsening  Chronicity:  New  Context: not sick contacts    Relieved by:  Nothing  Worsened by:  Deep breathing  Ineffective treatments:  Beta-agonist inhaler (antibiotic, prednisone)  Associated symptoms: chills and wheezing    Associated symptoms: no chest pain, no diaphoresis, no fever, no headaches, no rash, no rhinorrhea, no shortness of breath and no sore throat        Prior to Admission Medications   Prescriptions Last Dose Informant Patient Reported? Taking? CALCIUM PO  Self Yes No   Sig: Take by mouth   MAGNESIUM PO   Yes No   Sig: Take by mouth   Multiple Vitamins-Minerals (IMMUNE SUPPORT PO)  Self Yes No   Sig: Take by mouth   Probiotic Product (PROBIOTIC DAILY PO)  Self Yes No   Sig: Take by mouth   albuterol (Proventil HFA) 90 mcg/act inhaler   No No   Sig: Inhale 2 puffs every 6 (six) hours as needed for wheezing   fish oil-omega-3 fatty acids 1000 MG capsule   Yes No   Sig: Take by mouth if needed   methylPREDNISolone 4 MG tablet therapy pack   No No   Sig: Use as directed on package      Facility-Administered Medications: None       History reviewed   No pertinent past medical history  Past Surgical History:   Procedure Laterality Date   • APPENDECTOMY     • KNEE SURGERY Right     tendon       Family History   Problem Relation Age of Onset   • Osteoporosis Mother    • Glaucoma Mother    • Hypertension Father    • Hyperlipidemia Father    • Glaucoma Father    • Ulcers Father    • No Known Problems Sister    • No Known Problems Daughter    • No Known Problems Maternal Grandmother    • No Known Problems Maternal Grandfather    • No Known Problems Paternal Grandmother    • No Known Problems Paternal Grandfather    • Lung cancer Maternal Aunt 46   • No Known Problems Maternal Aunt    • No Known Problems Paternal Aunt      I have reviewed and agree with the history as documented  E-Cigarette/Vaping   • E-Cigarette Use Never User      E-Cigarette/Vaping Substances   • Nicotine No    • THC No    • CBD No    • Flavoring No    • Other No    • Unknown No      Social History     Tobacco Use   • Smoking status: Never   • Smokeless tobacco: Never   Vaping Use   • Vaping Use: Never used   Substance Use Topics   • Alcohol use: Yes     Comment: occ-minimum   • Drug use: No       Review of Systems   Constitutional: Positive for chills  Negative for diaphoresis and fever  HENT: Negative for congestion, rhinorrhea and sore throat  Respiratory: Positive for cough and wheezing  Negative for shortness of breath  Cardiovascular: Negative for chest pain  Gastrointestinal: Negative for abdominal pain, diarrhea, nausea and vomiting  Genitourinary: Negative for dysuria  Musculoskeletal: Negative for back pain and neck pain  Skin: Negative for color change and rash  Neurological: Negative for dizziness, weakness, light-headedness, numbness and headaches  Psychiatric/Behavioral: Negative for confusion and decreased concentration  All other systems reviewed and are negative  Physical Exam  Physical Exam  Vitals and nursing note reviewed     Constitutional:       General: She is not in acute distress  Appearance: Normal appearance  She is well-developed, well-groomed and normal weight  She is not ill-appearing or diaphoretic  HENT:      Head: Normocephalic and atraumatic  Right Ear: Hearing normal       Left Ear: Hearing normal       Nose: Nose normal       Mouth/Throat:      Mouth: Mucous membranes are moist    Eyes:      Conjunctiva/sclera: Conjunctivae normal    Cardiovascular:      Rate and Rhythm: Regular rhythm  Tachycardia present  Heart sounds: Normal heart sounds  Pulmonary:      Effort: Pulmonary effort is normal  No tachypnea, accessory muscle usage, prolonged expiration, respiratory distress or retractions  Breath sounds: Examination of the right-lower field reveals rhonchi  Wheezing and rhonchi present  No decreased breath sounds or rales  Musculoskeletal:         General: Normal range of motion  Cervical back: Normal range of motion and neck supple  Right lower leg: No edema  Left lower leg: No edema  Skin:     General: Skin is warm and dry  Coloration: Skin is not jaundiced or pale  Findings: No rash  Neurological:      General: No focal deficit present  Mental Status: She is alert and oriented to person, place, and time  Cranial Nerves: No cranial nerve deficit  Motor: No weakness  Psychiatric:         Behavior: Behavior is cooperative           Vital Signs  ED Triage Vitals [06/22/23 2118]   Temperature Pulse Respirations Blood Pressure SpO2   98 2 °F (36 8 °C) (!) 111 20 135/99 95 %      Temp Source Heart Rate Source Patient Position - Orthostatic VS BP Location FiO2 (%)   Temporal Monitor Sitting Left arm --      Pain Score       No Pain           Vitals:    06/22/23 2118 06/22/23 2350   BP: 135/99    Pulse: (!) 111 89   Patient Position - Orthostatic VS: Sitting          Visual Acuity      ED Medications  Medications   ipratropium-albuterol (DUO-NEB) 0 5-2 5 mg/3 mL inhalation solution 3 mL (3 mL Nebulization Given 6/22/23 8144)       Diagnostic Studies  Results Reviewed     Procedure Component Value Units Date/Time    D-Dimer [[id number]]  (Normal) Collected: 06/22/23 2244    Lab Status: Final result Specimen: Blood from Arm, Left Updated: 06/22/23 2345     D-Dimer, Quant 0 29 ug/ml FEU     Narrative: In the evaluation for possible pulmonary embolism, in the appropriate (Well's Score of 4 or less) patient, the age adjusted d-dimer cutoff for this patient can be calculated as:    Age x 0 01 (in ug/mL) for Age-adjusted D-dimer exclusion threshold for a patient over 50 years  FLU/RSV/COVID - if FLU/RSV clinically relevant [[id number]]  (Normal) Collected: 06/22/23 2246    Lab Status: Final result Specimen: Nares from Nose Updated: 06/22/23 2334     SARS-CoV-2 Negative     INFLUENZA A PCR Negative     INFLUENZA B PCR Negative     RSV PCR Negative    Narrative:      FOR PEDIATRIC PATIENTS - copy/paste COVID Guidelines URL to browser: [email or web address]  [email or web address]    SARS-CoV-2 assay is a Nucleic Acid Amplification assay intended for the  qualitative detection of nucleic acid from SARS-CoV-2 in nasopharyngeal  swabs  Results are for the presumptive identification of SARS-CoV-2 RNA  Positive results are indicative of infection with SARS-CoV-2, the virus  causing COVID-19, but do not rule out bacterial infection or co-infection  with other viruses  Laboratories within the [place] and its  territories are required to report all positive results to the appropriate  public health authorities  Negative results do not preclude SARS-CoV-2  infection and should not be used as the sole basis for treatment or other  patient management decisions  Negative results must be combined with  clinical observations, patient history, and epidemiological information  This test has not been FDA cleared or approved      This test has been authorized by FDA under an Emergency Use Authorization  (EUA)  This test is only authorized for the duration of time the  declaration that circumstances exist justifying the authorization of the  emergency use of an in vitro diagnostic tests for detection of SARS-CoV-2  virus and/or diagnosis of COVID-19 infection under section 564(b)(1) of  the Act, 21 U  S C  313TIR-0(C)(4), unless the authorization is terminated  or revoked sooner  The test has been validated but independent review by FDA  and CLIA is pending  Test performed using [employer] GeneXpert: This RT-PCR assay targets N2,  a region unique to SARS-CoV-2  A conserved region in the E-gene was chosen  for pan-Sarbecovirus detection which includes SARS-CoV-2  According to CMS-2020-01-R, this platform meets the definition of high-throughput technology  Procalcitonin [[id number]]  (Normal) Collected: 06/22/23 2244    Lab Status: Final result Specimen: Blood from Arm, Left Updated: 06/22/23 2321     Procalcitonin <0 05 ng/ml     HS Troponin 0hr (reflex protocol) [[id number]]  (Normal) Collected: 06/22/23 2244    Lab Status: Final result Specimen: Blood from Arm, Left Updated: 06/22/23 2318     hs TnI 0hr 3 ng/L     Lactic acid, plasma (w/reflex if result > 2 0) [[id number]]  (Normal) Collected: 06/22/23 2244    Lab Status: Final result Specimen: Blood from Arm, Left Updated: 06/22/23 2311     LACTIC ACID 1 0 mmol/L     Narrative:      Result may be elevated if tourniquet was used during collection      Comprehensive metabolic panel [[id number]] Collected: 06/22/23 2244    Lab Status: Final result Specimen: Blood from Arm, Left Updated: 06/22/23 2310     Sodium 138 mmol/L      Potassium 3 6 mmol/L      Chloride 104 mmol/L      CO2 27 mmol/L      ANION GAP 7 mmol/L      BUN 18 mg/dL      Creatinine 0 89 mg/dL      Glucose 121 mg/dL      Calcium 9 3 mg/dL      AST 17 U/L      ALT 16 U/L      Alkaline Phosphatase 60 U/L      Total Protein 6 7 g/dL      Albumin 4 1 g/dL      Total Bilirubin 0 26 mg/dL eGFR 69 ml/min/1 73sq m     Narrative:      [place] guidelines for Chronic Kidney Disease (CKD):   •  Stage 1 with normal or high GFR (GFR > 90 mL/min/1 73 square meters)  •  Stage 2 Mild CKD (GFR = 60-89 mL/min/1 73 square meters)  •  Stage 3A Moderate CKD (GFR = 45-59 mL/min/1 73 square meters)  •  Stage 3B Moderate CKD (GFR = 30-44 mL/min/1 73 square meters)  •  Stage 4 Severe CKD (GFR = 15-29 mL/min/1 73 square meters)  •  Stage 5 End Stage CKD (GFR <15 mL/min/1 73 square meters)  Note: GFR calculation is accurate only with a steady state creatinine    CBC and differential [[id number]]  (Abnormal) Collected: 06/22/23 2244    Lab Status: Final result Specimen: Blood from Arm, Left Updated: 06/22/23 2255     WBC 12 81 Thousand/uL      RBC 4 24 Million/uL      Hemoglobin 12 0 g/dL      Hematocrit 36 9 %      MCV 87 fL      MCH 28 3 pg      MCHC 32 5 g/dL      RDW 13 3 %      MPV 9 2 fL      Platelets 639 Thousands/uL      nRBC 0 /100 WBCs      Neutrophils Relative 61 %      Immat GRANS % 1 %      Lymphocytes Relative 31 %      Monocytes Relative 6 %      Eosinophils Relative 1 %      Basophils Relative 0 %      Neutrophils Absolute 7 94 Thousands/µL      Immature Grans Absolute 0 10 Thousand/uL      Lymphocytes Absolute 3 91 Thousands/µL      Monocytes Absolute 0 76 Thousand/µL      Eosinophils Absolute 0 06 Thousand/µL      Basophils Absolute 0 04 Thousands/µL     Blood culture #1 [[id number]] Collected: 06/22/23 2244    Lab Status: In process Specimen: Blood from Arm, Left Updated: 06/22/23 2252    Blood culture #2 [[id number]] Collected: 06/22/23 2244    Lab Status: In process Specimen: Blood from Arm, Left Updated: 06/22/23 2252                 XR chest pa & lateral   ED Interpretation by Fiona Saldaña PA-C (06/22 2347)   No acute abnormalities                    Procedures  ECG 12 Lead Documentation Only    Date/Time: 6/23/2023 10:55 PM    Performed by: Jean Cordoba Ewelina Silver PA-C  Authorized by: Girish Hunter PA-C    Indications / Diagnosis:  Tachycardia  ECG reviewed by me, the ED Provider: yes    Patient location:  ED  Previous ECG:     Previous ECG:  Unavailable  Rate:     ECG rate:  80  Rhythm:     Rhythm: sinus rhythm    Conduction:     Conduction: normal    ST segments:     ST segments:  Normal  T waves:     T waves: normal               ED Course  ED Course as of 06/23/23 0053   Thu Jun 22, 2023   2340 Patient re-examined, Ronchi RLL, wheezing cleared, good air movement, pulse ox 95-97% RA  HEART Risk Score    Flowsheet Row Most Recent Value   Heart Score Risk Calculator    History 0 Filed at: 06/23/2023 0048   ECG 0 Filed at: 06/23/2023 0048   Age 1 Filed at: 06/23/2023 0048   Risk Factors 0 Filed at: 06/23/2023 0048   Troponin 0 Filed at: 06/23/2023 0048   HEART Score 1 Filed at: 06/23/2023 0048                        SBIRT 22yo+    Flowsheet Row Most Recent Value   Initial Alcohol Screen: US AUDIT-C     1  How often do you have a drink containing alcohol? 0 Filed at: 06/22/2023 2120   2  How many drinks containing alcohol do you have on a typical day you are drinking? 0 Filed at: 06/22/2023 2120   3a  Male UNDER 65: How often do you have five or more drinks on one occasion? 0 Filed at: 06/22/2023 2120   3b  FEMALE Any Age, or MALE 65+: How often do you have 4 or more drinks on one occassion? 0 Filed at: 06/22/2023 2120   Audit-C Score 0 Filed at: 06/22/2023 2120   YISEL: How many times in the past year have you    Used an illegal drug or used a prescription medication for non-medical reasons?  Never Filed at: 06/22/2023 2120          Wells' Criteria for PE    Flowsheet Row Most Recent Value   Wells' Criteria for PE    Clinical signs and symptoms of DVT 0 Filed at: 06/23/2023 4423   PE is primary diagnosis or equally likely 0 Filed at: 06/23/2023 0048   HR >100 0 Filed at: 06/23/2023 0048   Immobilization at least 3 days or Surgery in the previous 4 weeks 0 Filed at: 06/23/2023 0048   Previous, objectively diagnosed PE or DVT 0 Filed at: 06/23/2023 0048   Hemoptysis 0 Filed at: 06/23/2023 3635   Malignancy with treatment within 6 months or palliative 0 Filed at: 06/23/2023 7945   Wells' Criteria Total 0 Filed at: 06/23/2023 7441                Medical Decision Making  Patient with cough x 2 weeks, will order labs, CXR to r/o pneumonia, cardiopulmonary disease  Ddimer negative, no chest pain or SOB, no concern for PE  Trop normal, heart score less than 4, no concern for cardiac disease  Patient improved with nebulizer  She is currently on albuterol, doxy, and prednisone, instructed her to continue these meds  Return precautions given  No signs of pneumonia on CXR, most likely bronchitis  Acute bronchitis: acute illness or injury  Cough: acute illness or injury  Amount and/or Complexity of Data Reviewed  Labs: ordered  Radiology: independent interpretation performed  ECG/medicine tests: ordered and independent interpretation performed  Risk  Prescription drug management  Disposition  Final diagnoses:   Cough   Acute bronchitis     Time reflects when diagnosis was documented in both MDM as applicable and the Disposition within this note     Time User Action Codes Description Comment    6/22/2023 11:53 PM Aliyah Matthews [R05 [id number]] Cough     6/22/2023 11:54 PM Aliyah Matthews [J20 [id number]] Acute bronchitis       ED Disposition     ED Disposition   Discharge    Condition   Stable    Date/Time   Thu Jun 22, 2023 11:53 PM    Comment   Jina Romo discharge to home/self care                 Follow-up Information     Follow up With Specialties Details Why Contact Info Additional Information    Alma Fleming DO Family Medicine Schedule an appointment as soon as possible for a visit in 1 day For [place] [address]  [address] [facility] Schedule an appointment as soon as possible for a visit  For recheck [address] [id number] [phone]  [facility] [address], [place], [facility], [address] [phone]          Discharge Medication List as of 6/22/2023 11:55 PM      CONTINUE these medications which have NOT CHANGED    Details   albuterol (Proventil HFA) 90 mcg/act inhaler Inhale 2 puffs every 6 (six) hours as needed for wheezing, Starting Mon 3/20/2023, Normal      CALCIUM PO Take by mouth, Historical Med      fish oil-omega-3 fatty acids 1000 MG capsule Take by mouth if needed, Historical Med      MAGNESIUM PO Take by mouth, Historical Med      methylPREDNISolone 4 MG tablet therapy pack Use as directed on package, Normal      Multiple Vitamins-Minerals (IMMUNE SUPPORT PO) Take by mouth, Historical Med      Probiotic Product (PROBIOTIC DAILY PO) Take by mouth, Historical Med             No discharge procedures on file      PDMP Review     None          ED Provider  Electronically Signed by           Noam Loya PA-C  06/23/23 8578

## 2023-06-28 LAB
BACTERIA BLD CULT: NORMAL
BACTERIA BLD CULT: NORMAL

## 2023-08-15 PROBLEM — J01.10 ACUTE NON-RECURRENT FRONTAL SINUSITIS: Status: RESOLVED | Noted: 2023-06-16 | Resolved: 2023-08-15

## 2023-09-15 ENCOUNTER — OFFICE VISIT (OUTPATIENT)
Dept: FAMILY MEDICINE CLINIC | Facility: CLINIC | Age: 63
End: 2023-09-15

## 2023-09-15 VITALS
BODY MASS INDEX: 26.72 KG/M2 | TEMPERATURE: 98.5 F | DIASTOLIC BLOOD PRESSURE: 76 MMHG | WEIGHT: 136.12 LBS | OXYGEN SATURATION: 98 % | HEART RATE: 89 BPM | HEIGHT: 60 IN | SYSTOLIC BLOOD PRESSURE: 112 MMHG

## 2023-09-15 DIAGNOSIS — J01.00 ACUTE NON-RECURRENT MAXILLARY SINUSITIS: Primary | ICD-10-CM

## 2023-09-15 DIAGNOSIS — J01.10 ACUTE NON-RECURRENT FRONTAL SINUSITIS: ICD-10-CM

## 2023-09-15 PROCEDURE — 99213 OFFICE O/P EST LOW 20 MIN: CPT | Performed by: NURSE PRACTITIONER

## 2023-09-15 RX ORDER — METHYLPREDNISOLONE 4 MG/1
TABLET ORAL
Qty: 21 TABLET | Refills: 0 | Status: SHIPPED | OUTPATIENT
Start: 2023-09-15 | End: 2023-10-01 | Stop reason: ALTCHOICE

## 2023-09-15 NOTE — PATIENT INSTRUCTIONS
Use flonase nasal spray 2 sprays each nostril once daily  Medrol dose pack   Change positions slowly  Increase fluids

## 2023-09-15 NOTE — PROGRESS NOTES
Name: Howard Parry      : 1960      MRN: [id number]  Encounter Provider: AARON Leung  Encounter Date: 9/15/2023   Encounter department: [facility] [address] [facility] [address]     1. Acute non-recurrent maxillary sinusitis  Assessment & Plan:  Use flonase nasal spray 2 sprays each nostril once daily  Medrol dose pack   Change positions slowly  Increase fluids      2. Acute non-recurrent frontal sinusitis  -     methylPREDNISolone 4 MG tablet therapy pack; Use as directed on package         Subjective        1 week history of sinus pressure in her cheeks. Feels a little light headed. Eyes feel heavy. Tried advil which did help relieve the pressure. Denies runny or stuffy nose. No cough. Review of Systems   Constitutional: Positive for fatigue. HENT: Positive for congestion, sinus pressure and sinus pain. Respiratory: Negative. Cardiovascular: Negative. Gastrointestinal: Negative. Genitourinary: Negative. Musculoskeletal: Negative. Skin: Negative. Neurological: Positive for light-headedness and headaches. Hematological: Negative. Current Outpatient Medications on File Prior to Visit   Medication Sig   • albuterol (Proventil HFA) 90 mcg/act inhaler Inhale 2 puffs every 6 (six) hours as needed for wheezing   • CALCIUM PO Take by mouth   • fish oil-omega-3 fatty acids 1000 MG capsule Take by mouth if needed   • MAGNESIUM PO Take by mouth   • Multiple Vitamins-Minerals (IMMUNE SUPPORT PO) Take by mouth   • Probiotic Product (PROBIOTIC DAILY PO) Take by mouth   • Mag Aspart-Potassium Aspart (POTASSIUM & MAGNESIUM ASPARTAT PO) Take by mouth       Objective     /76   Pulse 89   Temp 98.5 °F (36.9 °C)   Ht 5' (1.524 m)   Wt 61.7 kg (136 lb 1.9 oz)   LMP  (LMP Unknown)   SpO2 98%   BMI 26.58 kg/m²     Physical Exam  Vitals and nursing note reviewed. Constitutional:       Appearance: Normal appearance. HENT:      Head: Normocephalic. Right Ear: Ear canal and external ear normal. A middle ear effusion is present. Left Ear: Ear canal and external ear normal. A middle ear effusion is present. Nose: Congestion present. Right Sinus: Maxillary sinus tenderness and frontal sinus tenderness present. Left Sinus: Maxillary sinus tenderness and frontal sinus tenderness present. Mouth/Throat:      Mouth: Mucous membranes are moist.      Pharynx: Oropharynx is clear. Comments: PND  Eyes:      Extraocular Movements: Extraocular movements intact. Conjunctiva/sclera: Conjunctivae normal.      Pupils: Pupils are equal, round, and reactive to light. Cardiovascular:      Rate and Rhythm: Normal rate and regular rhythm. Heart sounds: Normal heart sounds. No murmur heard. Pulmonary:      Effort: No respiratory distress. Breath sounds: Normal breath sounds. Abdominal:      General: Bowel sounds are normal.      Palpations: Abdomen is soft. Musculoskeletal:      Cervical back: Normal range of motion. Right lower leg: No edema. Left lower leg: No edema. Lymphadenopathy:      Cervical: No cervical adenopathy. Skin:     Findings: No rash. Neurological:      General: No focal deficit present. Mental Status: She is alert and oriented to person, place, and time.    Psychiatric:         Mood and Affect: Mood normal.         Behavior: Behavior normal.       Judith Vallejo

## 2023-09-17 PROBLEM — J01.00 ACUTE NON-RECURRENT MAXILLARY SINUSITIS: Status: ACTIVE | Noted: 2023-09-17

## 2023-09-27 ENCOUNTER — OFFICE VISIT (OUTPATIENT)
Dept: FAMILY MEDICINE CLINIC | Facility: CLINIC | Age: 63
End: 2023-09-27

## 2023-09-27 VITALS
HEART RATE: 76 BPM | DIASTOLIC BLOOD PRESSURE: 60 MMHG | BODY MASS INDEX: 26.9 KG/M2 | HEIGHT: 60 IN | WEIGHT: 137 LBS | TEMPERATURE: 98.5 F | OXYGEN SATURATION: 97 % | SYSTOLIC BLOOD PRESSURE: 102 MMHG

## 2023-09-27 DIAGNOSIS — R42 EPISODIC LIGHTHEADEDNESS: Primary | ICD-10-CM

## 2023-09-27 PROCEDURE — 99214 OFFICE O/P EST MOD 30 MIN: CPT | Performed by: FAMILY MEDICINE

## 2023-09-27 NOTE — PROGRESS NOTES
Assessment/Plan:      1. Episodic lightheadedness  Assessment & Plan:  Likely multifactorial. Pt will ensure adequate fluid intake with some electrolyte fluid to avoid dehydration. Pt will increase protein intake and decrease carb intake to help stabilize blood sugar, possible low blood sugar. Maximize allergy treatment with claritin or zyrtec and steroid nasal spray. Consider vestibular evaluation           Subjective:  Chief Complaint   Patient presents with   • Follow-up     Sinus pressure and foggy ear pain, was on steroid and nothing work,         Patient ID: Patricia Jacobsen is a 61 y.o. female. Complains of eyes feel heavy, Head feels foggy Thinking is ok. No nasal drainage. No cough, no chest pain, no sore throat, no headache. Sleeping ok. Took prednisone-did not help at all. Seen at [facility]. Also seen at [facility] - had doxycycline and prednisone  Possible  Pt good with drinking fluids- will drink some electrolyte fluids         Review of Systems   Constitutional: Negative. Negative for fatigue and fever. HENT: Negative. Eyes: Negative. Respiratory: Negative. Negative for cough. Cardiovascular: Negative. Gastrointestinal: Negative. Endocrine: Negative. Genitourinary: Negative. Musculoskeletal: Negative. Skin: Negative. Allergic/Immunologic: Negative. Neurological: Positive for light-headedness. Psychiatric/Behavioral: Negative. Negative for confusion, decreased concentration and sleep disturbance.          The following portions of the patient's history were reviewed and updated as appropriate: allergies, current medications, past family history, past medical history, past social history, past surgical history and problem list.    Objective:  Vitals:    09/27/23 1113   BP: 102/60   Pulse: 76   Temp: 98.5 °F (36.9 °C)   TempSrc: Tympanic   SpO2: 97%   Weight: 62.1 kg (137 lb)   Height: 5' (1.524 m)      Physical Exam  Vitals and nursing note reviewed. Constitutional:       Appearance: She is well-developed. HENT:      Head: Normocephalic and atraumatic. Right Ear: Tympanic membrane normal.      Left Ear: Tympanic membrane normal.      Nose: Congestion present. Cardiovascular:      Rate and Rhythm: Normal rate and regular rhythm. Heart sounds: Normal heart sounds. Pulmonary:      Effort: Pulmonary effort is normal.      Breath sounds: Normal breath sounds. Abdominal:      General: Bowel sounds are normal.      Palpations: Abdomen is soft. Skin:     General: Skin is warm and dry. Neurological:      Mental Status: She is alert and oriented to person, place, and time. Psychiatric:         Behavior: Behavior normal.         Thought Content:  Thought content normal.         Judgment: Judgment normal.

## 2023-09-27 NOTE — PATIENT INSTRUCTIONS
Increase fluid intake   Electrolyte water daily  Claritin or zyrtec daily   Flonase 2 sprays each nostril daily   Increase protein in diet   Consider Meclizine 1 tab 3 times a day        Meal Planning with Diabetes Exchanges   AMBULATORY CARE:   Diabetes exchanges  are servings of food that contain similar amounts of carbohydrate, fat, protein, and calories within a food group. The exchanges can be used to develop a healthy meal plan that helps to keep your blood sugar within the recommended levels. A meal plan with the right amount of carbohydrates is especially important. Your blood sugar naturally rises after you eat carbohydrates. Too many carbohydrates in 1 meal or snack can raise your blood sugar level. Carbohydrates are found in starches, fruit, milk, yogurt, and sweets. Call your doctor or diabetes care provider if:   You have high blood sugar levels during a certain time of day, or almost all of the time. You often have low blood sugar levels. You have questions or concerns about your condition or care. Create a meal plan with exchanges:  A dietitian will work with you to develop a healthy meal plan that is right for you. This meal plan will include the amount of exchanges you can have from each food group throughout the day. Follow your meal plan by keeping track of the amount of exchanges you eat for each meal and snack. Your meal plan will be based on your age, weight, blood sugar levels, medicine, and activity level. Starch food group exchanges:  Each exchange below contains about 15 grams of carbohydrate , 3 grams of protein, 1 gram of fat, and 80 calories. 1 ounce of white, whole wheat or rye bread (1 slice)    1 ounce of bagel (about ¼ of a bagel)    1 6-inch flour or corn tortilla or 1 4-inch pancake (about ¼ inch thick)    ?  cup of cooked pasta or rice    ¾ cup of dry, ready-to-eat cereal with no sugar added    ½ cup of cooked cereal, such as oatmeal    3 luis cracker squares or 8 animal crackers    6 saltine-type crackers    3 cups of popcorn or ¾ ounce of pretzels    Starchy vegetables and cooked legumes:      ½ cup of corn, green peas, sweet potatoes, or mashed potatoes    ¼ of a large baked potato    1 cup of acorn, butternut squash, or pumpkin    ½ cup of beans, lentils, or peas (such as tyson, kidney, or black-eyed)    ? cup of lima beans    Fruit group exchanges:  Each exchange contains about 15 grams of carbohydrate  and 60 calories. 1 small (4 ounce) apple, banana orange, or nectarine    ½ cup of canned or fresh fruit    ½ cup (4 ounces) of unsweetened fruit juice    2 tablespoons of dried fruit    Milk group exchanges:  Each exchange contains about 12 grams of carbohydrate  and 8 grams of protein. The amount of fat and calories in each serving depends on the type of milk (such as whole, low-fat, or fat-free). 1 cup fat-free or low-fat milk    ¾ cup of plain, nonfat yogurt    1 cup fat-free, flavored yogurt with artificial (no calorie) sweetener    Non-starchy vegetable group exchanges:  Each exchange contains about 5 grams of carbohydrate , 2 grams of protein, and 25 calories. Examples include beets, broccoli, cabbage, carrots, cauliflower, cucumber, mushrooms, tomatoes, and zucchini. ½ cup of cooked vegetables or 1 cup of raw vegetables    ½ cup of vegetable juice    Meat and meat substitute group exchanges:  Each exchange of a lean meat  listed below contains about 7 grams of protein, 0 to 3 grams of fat, and 45 calories. The meat and meat substitutes food group does not contain any carbohydrates. Medium and high-fat meats have more calories.   1 ounce of chicken or turkey without skin, or 1 ounce of fish (not breaded or fried)    1 ounce of lean beef, pork, or lamb    1-inch cube or 1 ounce of low-fat cheese    2 egg whites or ¼ cup of egg substitute    ½ cup of tofu    Sweets, desserts, and other carbohydrate group exchanges:   Sweets and other desserts:  Each exchange has about 15 grams of carbohydrate . 1 ounce of eleonora food cake or 2-inch square cake (unfrosted)    2 small cookies    ½ cup of sugar-free, fat-free ice cream    1 tablespoon of syrup, jam, jelly, table sugar, or honey    Combination foods:     1 cup of an entrée, such as lasagna, spaghetti with meatballs, macaroni and cheese, and chili with beans (each serving counts as 2 carbohydrate exchanges )    1 cup of tomato or vegetable beef soup (each serving counts as 1 carbohydrate exchange )    Fat group exchanges:  Each exchange contains 5 grams of fat and 45 calories. 1 teaspoon of oil (such as canola, olive, or corn oil)    6 almonds or cashews, 10 peanuts, or 4 pecan halves    2 tablespoons of avocado    ½ tablespoon of peanut butter    1 teaspoon of regular margarine or 2 teaspoons of low-fat margarine    1 teaspoon of regular butter or 1 tablespoon of low-fat butter    1 teaspoon of regular mayonnaise or 1 tablespoon of low-fat mayonnaise    1 tablespoon of regular salad dressing or 2 tablespoons of low-fat salad dressing    Free foods: The foods on this list are called free foods because they have very few calories. Free foods usually do not increase your blood sugar if you limit them. 1 tablespoon of catsup or taco sauce    ¼ cup of salsa    2 tablespoons of sugar-free syrup or 2 teaspoons of light jam or jelly    1 tablespoon of fat-free salad dressing    4 tablespoons of fat-free margarine or fat-free mayonnaise    Sugar-free drinks: diet soda, sugar-free drink mixes, or mineral water    Low-sodium bouillon or fat-free broth    Mustard    Seasonings such as spices, herbs, and garlic    Sugar-free gelatin without added fruit    Other healthy nutrition guidelines:   Limit drinks with sugar substitutes. Your dietitian or healthcare provider will encourage you to drink water. Water helps your kidneys to function properly. Ask how much water you should drink every day. Eat more fiber.   Choose foods that are good sources of fiber, such as fruits, vegetables, and whole grains. Cereals that contain 5 or more grams of fiber per serving are good sources of fiber. Legumes such as garbanzo, tyson beans, kidney beans, and lentils are also good sources. Limit fat. Ask your dietitian or healthcare provider how much fat you should eat each day. Choose foods low in fat, saturated fat, trans fat, and cholesterol. Examples include turkey or chicken without the skin, fish, lean cuts of meat, and beans. Low-fat dairy foods, such as low-fat or fat-free milk and low-fat yogurt are also good choices. Omega-3 fatty acids are healthy fats that are found in canola oil, soybean oil and fatty fish. Lutz, albacore tuna, and sardines are good sources of omega 3 fatty acids. Eat 2 servings of these types of fish each week. Do not eat fried fish. Limit sugar. Sugar and sweets must be counted toward the carbohydrate exchanges that you can have within your meal plan. Limit sugar and sweets because they are usually also high in calories and fat. Eat smaller portions of sweets by sharing a dessert or asking for a child-size portion at a restaurant. Limit sodium  (salt) to about 2,300 mg per day. You may need to eat even less sodium if you have certain medical conditions. Foods high in sodium include soy sauce, potato chips, and soup. Limit alcohol. Ask your healthcare provider if it is safe for you to drink alcohol. If alcohol is safe for you to have, eat a meal when you drink alcohol. If you drink alcohol on an empty stomach, your blood sugar may drop to a low level. Women 21 years or older and men 72 years or older should limit alcohol to 1 drink a day. Men aged 24 to 59 years should limit alcohol to 2 drinks a day. A drink of alcohol is 5 ounces of wine, 12 ounces of beer, or 1½ ounces of liquor. Other ways to manage diabetes:   Control your blood sugar level.   Test your blood sugar level regularly and keep a record of the results. Ask your healthcare provider when and how often to test your blood sugar. You may need to check your blood sugar level at least 3 times each day. Talk to your healthcare provider about your weight. Ask if you need to lose weight, and how much you need to lose. If you are overweight, you may need to make other changes to lose weight. Ask your healthcare provider to help you create a weight loss program.    Get regular physical activity. Physical activity can help decrease your blood sugar level. It can also help to decrease your risk for heart disease and help you lose weight. Adults should have moderate intensity physical activity for at least 150 minutes every week. Spread the amount of activity over at least 3 days a week. Do not skip more than 2 days in a row. Children should get at least 60 minutes of moderate physical activity on most days of the week. Examples of moderate physical activity include brisk walking, running, and swimming. Do not sit for longer than 30 minutes. Work with your healthcare provider to create a plan for physical activity. © Copyright [employer] 2023 Information is for End User's use only and may not be sold, redistributed or otherwise used for commercial purposes. The above information is an  only. It is not intended as medical advice for individual conditions or treatments. Talk to your doctor, nurse or pharmacist before following any medical regimen to see if it is safe and effective for you.

## 2023-10-01 PROBLEM — R42 EPISODIC LIGHTHEADEDNESS: Status: ACTIVE | Noted: 2023-10-01

## 2023-10-01 NOTE — ASSESSMENT & PLAN NOTE
Likely multifactorial. Pt will ensure adequate fluid intake with some electrolyte fluid to avoid dehydration. Pt will increase protein intake and decrease carb intake to help stabilize blood sugar, possible low blood sugar. Maximize allergy treatment with claritin or zyrtec and steroid nasal spray.    Consider vestibular evaluation

## 2023-10-19 ENCOUNTER — OFFICE VISIT (OUTPATIENT)
Dept: FAMILY MEDICINE CLINIC | Facility: CLINIC | Age: 63
End: 2023-10-19

## 2023-10-19 VITALS
SYSTOLIC BLOOD PRESSURE: 104 MMHG | OXYGEN SATURATION: 96 % | TEMPERATURE: 97.7 F | HEIGHT: 60 IN | DIASTOLIC BLOOD PRESSURE: 62 MMHG | BODY MASS INDEX: 25.52 KG/M2 | WEIGHT: 130 LBS | HEART RATE: 78 BPM

## 2023-10-19 DIAGNOSIS — J01.00 ACUTE NON-RECURRENT MAXILLARY SINUSITIS: ICD-10-CM

## 2023-10-19 DIAGNOSIS — H66.001 NON-RECURRENT ACUTE SUPPURATIVE OTITIS MEDIA OF RIGHT EAR WITHOUT SPONTANEOUS RUPTURE OF TYMPANIC MEMBRANE: Primary | ICD-10-CM

## 2023-10-19 PROCEDURE — 99213 OFFICE O/P EST LOW 20 MIN: CPT | Performed by: NURSE PRACTITIONER

## 2023-10-19 RX ORDER — CEFUROXIME AXETIL 500 MG/1
500 TABLET ORAL EVERY 12 HOURS SCHEDULED
Qty: 28 TABLET | Refills: 0 | Status: SHIPPED | OUTPATIENT
Start: 2023-10-19 | End: 2023-11-02

## 2023-10-19 NOTE — PROGRESS NOTES
Name: Judge Winn      : 1960      MRN: [id number]  Encounter Provider: Rosann Ganser, CRNP  Encounter Date: 10/19/2023   Encounter department: [facility] [address] [place]     1. Non-recurrent acute suppurative otitis media of right ear without spontaneous rupture of tympanic membrane  -     cefuroxime (CEFTIN) 500 mg tablet; Take 1 tablet (500 mg total) by mouth every 12 (twelve) hours for 14 days    2. Acute non-recurrent maxillary sinusitis  Assessment & Plan:  Ceftin twice daily for 14 days    Orders:  -     cefuroxime (CEFTIN) 500 mg tablet; Take 1 tablet (500 mg total) by mouth every 12 (twelve) hours for 14 days      Depression Screening and Follow-up Plan: Patient was screened for depression during today's encounter. They screened negative with a PHQ-2 score of 0. Subjective      Pain, pressure, slight dizziness ( feels she is spinning slightly), lightheaded (foggy) dazing can't focus at times, light nausea feels hungry, tired. Sometimes sensitive to noise. Feels the best and clearest when she is at the gym    Tried flonase and claritin, zrytec and none of those helped. Goes to get facials- did stone pressure massage which gave her relief for about 36 hours which helped with drainaged  Seeing chiropractor which helped short term    Saw eye doctor- eyes normal  Worked with nutritionist- started on coricidin taking 2 twice daily    Steroids didn't help             Review of Systems   Constitutional:  Positive for fatigue. Negative for fever. HENT:  Positive for congestion, ear pain, postnasal drip, sinus pressure and sinus pain. Eyes: Negative. Respiratory: Negative. Negative for cough. Cardiovascular: Negative. Gastrointestinal:  Positive for nausea. Endocrine: Negative. Genitourinary: Negative. Musculoskeletal: Negative. Skin: Negative. Allergic/Immunologic: Negative.     Neurological:  Positive for dizziness, light-headedness and headaches. Psychiatric/Behavioral: Negative. Negative for confusion, decreased concentration and sleep disturbance. Current Outpatient Medications on File Prior to Visit   Medication Sig   • albuterol (Proventil HFA) 90 mcg/act inhaler Inhale 2 puffs every 6 (six) hours as needed for wheezing (Patient not taking: Reported on 10/26/2023)   • CALCIUM PO Take by mouth   • fish oil-omega-3 fatty acids 1000 MG capsule Take by mouth if needed   • Mag Aspart-Potassium Aspart (POTASSIUM & MAGNESIUM ASPARTAT PO) Take by mouth (Patient not taking: Reported on 10/26/2023)   • MAGNESIUM PO Take by mouth   • Multiple Vitamins-Minerals (IMMUNE SUPPORT PO) Take by mouth   • Probiotic Product (PROBIOTIC DAILY PO) Take by mouth       Objective     /62   Pulse 78   Temp 97.7 °F (36.5 °C) (Tympanic)   Ht 5' (1.524 m)   Wt 59 kg (130 lb)   LMP  (LMP Unknown)   SpO2 96%   BMI 25.39 kg/m²     Physical Exam  Vitals and nursing note reviewed. Constitutional:       Appearance: Normal appearance. HENT:      Head: Normocephalic. Right Ear: Ear canal and external ear normal. A middle ear effusion is present. Tympanic membrane is erythematous and bulging. Left Ear: Ear canal and external ear normal.      Nose: Congestion present. Right Sinus: Maxillary sinus tenderness and frontal sinus tenderness present. Left Sinus: Maxillary sinus tenderness and frontal sinus tenderness present. Mouth/Throat:      Mouth: Mucous membranes are moist.      Pharynx: Oropharynx is clear. Comments: PND  Eyes:      Extraocular Movements: Extraocular movements intact. Conjunctiva/sclera: Conjunctivae normal.      Pupils: Pupils are equal, round, and reactive to light. Cardiovascular:      Rate and Rhythm: Normal rate and regular rhythm. Heart sounds: Normal heart sounds. No murmur heard. Pulmonary:      Effort: No respiratory distress. Breath sounds: Normal breath sounds.    Abdominal: General: Bowel sounds are normal.      Palpations: Abdomen is soft. Musculoskeletal:      Cervical back: Normal range of motion. Right lower leg: No edema. Left lower leg: No edema. Lymphadenopathy:      Cervical: No cervical adenopathy. Skin:     Findings: No rash. Neurological:      General: No focal deficit present. Mental Status: She is alert and oriented to person, place, and time.    Psychiatric:         Mood and Affect: Mood normal.         Behavior: Behavior normal.       Nga Gutting

## 2023-10-23 ENCOUNTER — HOSPITAL ENCOUNTER (OUTPATIENT)
Dept: MAMMOGRAPHY | Facility: CLINIC | Age: 63
Discharge: HOME | End: 2023-10-23
Payer: COMMERCIAL

## 2023-10-23 DIAGNOSIS — R92.8 ABNORMAL FINDINGS ON DIAGNOSTIC IMAGING OF BREAST: ICD-10-CM

## 2023-10-23 PROCEDURE — G0279 TOMOSYNTHESIS, MAMMO: HCPCS

## 2023-10-23 PROCEDURE — 77065 DX MAMMO INCL CAD UNI: CPT

## 2023-10-24 ENCOUNTER — TELEPHONE (OUTPATIENT)
Dept: FAMILY MEDICINE CLINIC | Facility: CLINIC | Age: 63
End: 2023-10-24

## 2023-10-24 DIAGNOSIS — G44.89 OTHER HEADACHE SYNDROME: ICD-10-CM

## 2023-10-24 DIAGNOSIS — M54.2 NECK PAIN: Primary | ICD-10-CM

## 2023-10-24 DIAGNOSIS — R92.8 ABNORMAL MAMMOGRAM OF BOTH BREASTS: Primary | ICD-10-CM

## 2023-10-24 NOTE — TELEPHONE ENCOUNTER
My name is Ivelisse Buenrostro. My YOB: 1960. I'd like to request a referral for a neurologist. Jacob Grullon been there three times for pain in my head. I'm on  CEFUROXIMEAXEIL. I'd like to get a referral for a neurologist. Thank you.  Bye.

## 2023-10-24 NOTE — TELEPHONE ENCOUNTER
----- Message from Shira Lanier DO sent at 10/24/2023  1:35 AM EDT -----  No mychart. Area on mammogram is stable, no change  Repeat diagnostic mammogram in 6 months.

## 2023-10-26 ENCOUNTER — OFFICE VISIT (OUTPATIENT)
Dept: NEUROLOGY | Facility: CLINIC | Age: 63
End: 2023-10-26
Payer: COMMERCIAL

## 2023-10-26 ENCOUNTER — APPOINTMENT (OUTPATIENT)
Dept: LAB | Facility: CLINIC | Age: 63
End: 2023-10-26
Payer: COMMERCIAL

## 2023-10-26 VITALS
WEIGHT: 139.4 LBS | BODY MASS INDEX: 27.37 KG/M2 | SYSTOLIC BLOOD PRESSURE: 120 MMHG | HEIGHT: 60 IN | DIASTOLIC BLOOD PRESSURE: 80 MMHG | HEART RATE: 78 BPM

## 2023-10-26 DIAGNOSIS — R53.83 FATIGUE: ICD-10-CM

## 2023-10-26 DIAGNOSIS — G44.209 TTH (TENSION-TYPE HEADACHE): Primary | ICD-10-CM

## 2023-10-26 LAB
25(OH)D3 SERPL-MCNC: 30.6 NG/ML (ref 30–100)
FOLATE SERPL-MCNC: 17.4 NG/ML
TSH SERPL DL<=0.05 MIU/L-ACNC: 1.99 UIU/ML (ref 0.45–4.5)
VIT B12 SERPL-MCNC: 345 PG/ML (ref 180–914)

## 2023-10-26 PROCEDURE — 82306 VITAMIN D 25 HYDROXY: CPT

## 2023-10-26 PROCEDURE — 36415 COLL VENOUS BLD VENIPUNCTURE: CPT

## 2023-10-26 PROCEDURE — 84443 ASSAY THYROID STIM HORMONE: CPT

## 2023-10-26 PROCEDURE — 82746 ASSAY OF FOLIC ACID SERUM: CPT

## 2023-10-26 PROCEDURE — 82607 VITAMIN B-12: CPT

## 2023-10-26 PROCEDURE — 99204 OFFICE O/P NEW MOD 45 MIN: CPT | Performed by: STUDENT IN AN ORGANIZED HEALTH CARE EDUCATION/TRAINING PROGRAM

## 2023-10-26 RX ORDER — FLUTICASONE PROPIONATE 50 MCG
1 SPRAY, SUSPENSION (ML) NASAL AS NEEDED
COMMUNITY

## 2023-10-26 NOTE — PROGRESS NOTES
[facility] Neurology Consult  PATIENT:  Ilene Hoffman  MRN:  [id number]  :  1960  DATE OF SERVICE:  10/26/2023  REFERRED BY: Self, Referral  PMD: Terri Redding DO    Assessment/Plan:     Ilene Hoffman is a very pleasant 63 y.o. female with no significant PMHx referred her for evaluation of headaches.     TTH  Preventative:  - we discussed headache hygiene and lifestyle factors that may improve headaches, including sleep hygiene, hydration with goal 60-80 oz daily, limiting caffeine intake, and not skipping meals  - suspect that current headaches are related to her sinus infection which is currently being treated. If no improvement in her symptoms after treatment, can be re-assessed by neurology    Abortive:  - discussed not taking over-the-counter or prescription pain medications more than 3 days per week to prevent medication overuse/rebound headache    Fatigue  -chronic issue per patient. Will send vit D, vit B12, TSH for further evaluation. Recommend f/u with PCP    CC:   Headaches     History of Present Illness:     63 y.o. female with no significant PMHx referred her for evaluation of headaches.     She states that she has been having pain all over her head for the past 1 month. She endorses a URI in August, ear infection, and sinus pressure. She is currently taking antibiotics and feels that her symptoms are improving. She endorses pressure, pain, slight dizziness. Endorses pain in the temples/below the temples, facial pressure. Overall feels foggy and fatigued. Feels like she has post-nasal drip. Denies nausea.     Sleep - sleeps 7-8 hrs a night. Feels like sleep is overall good.  Water - drinks at least 64 oz water daily    Dizziness - room spinning just for a brief second.     Past Medical History:   No past medical history on file.    Patient Active Problem List   Diagnosis    Carpal tunnel syndrome on both sides    Pelvic pain    Varicose veins of lower extremity with inflammation     Tracheobronchitis    Acute non-recurrent maxillary sinusitis    Episodic lightheadedness       Medications:      Current Outpatient Medications   Medication Sig Dispense Refill    CALCIUM PO Take by mouth      cefuroxime (CEFTIN) 500 mg tablet Take 1 tablet (500 mg total) by mouth every 12 (twelve) hours for 14 days 28 tablet 0    Cetrorelix Acetate (CETROTIDE SC) Inject under the skin      fish oil-omega-3 fatty acids 1000 MG capsule Take by mouth if needed      fluticasone (FLONASE) 50 mcg/act nasal spray 1 spray into each nostril daily      MAGNESIUM PO Take by mouth      Multiple Vitamins-Minerals (IMMUNE SUPPORT PO) Take by mouth      Probiotic Product (PROBIOTIC DAILY PO) Take by mouth      albuterol (Proventil HFA) 90 mcg/act inhaler Inhale 2 puffs every 6 (six) hours as needed for wheezing (Patient not taking: Reported on 10/26/2023) 6.7 g 0    Mag Aspart-Potassium Aspart (POTASSIUM & MAGNESIUM ASPARTAT PO) Take by mouth (Patient not taking: Reported on 10/26/2023)       No current facility-administered medications for this visit.        Allergies:      Allergies   Allergen Reactions    Levofloxacin Anaphylaxis     Other reaction(s): joint pain (02/28/2013), TENDON SORENESS, Unknown  tendinopathy        Family History:     Family History   Problem Relation Age of Onset    Osteoporosis Mother     Glaucoma Mother     Hypertension Father     Hyperlipidemia Father     Glaucoma Father     Ulcers Father     No Known Problems Sister     No Known Problems Daughter     No Known Problems Maternal Grandmother     No Known Problems Maternal Grandfather     No Known Problems Paternal Grandmother     No Known Problems Paternal Grandfather     Lung cancer Maternal Aunt 50    No Known Problems Maternal Aunt     No Known Problems Paternal Aunt     Breast cancer Neg Hx     Colon cancer Neg Hx     Endometrial cancer Neg Hx     Ovarian cancer Neg Hx        Social History:       Social History     Socioeconomic History     Marital status: /Civil Union     Spouse name: Not on file    Number of children: Not on file    Years of education: Not on file    Highest education level: Not on file   Occupational History    Not on file   Tobacco Use    Smoking status: Never    Smokeless tobacco: Never   Vaping Use    Vaping Use: Never used   Substance and Sexual Activity    Alcohol use: Yes     Comment: occ-minimum    Drug use: No    Sexual activity: Yes     Partners: Male   Other Topics Concern    Not on file   Social History Narrative    Not on file     Social Determinants of Health     Financial Resource Strain: Not on file   Food Insecurity: Not on file   Transportation Needs: Not on file   Physical Activity: Not on file   Stress: Not on file   Social Connections: Not on file   Intimate Partner Violence: Not on file   Housing Stability: Not on file         Objective:   /80 (BP Location: Left arm, Patient Position: Sitting, Cuff Size: Standard)   Pulse 78   Ht 5' (1.524 m)   Wt 63.2 kg (139 lb 6.4 oz)   LMP  (LMP Unknown)   BMI 27.22 kg/m²     General: Patient is not in any acute/apparent distress, well nourished, well developed and cooperative.   HEENT: normocephalic, atraumatic, moist membranes  Neck: supple  Extremities: no edema noted   Skin: no lesions or rash  Musculosketal: no bony abnormalities    Neurologic Examination:   Mental status: alert, awake, oriented X 3 and following commands.     Speech/Language: Speech is fluent without any dysarthria, no aphasia noted, can name, repeat, read and write and comprehension intact    Cranial Nerves:   CN I: smell not tested  CN II: Visual fields full to confrontation  CN III, IV, VI: Extraocular movements intact bilaterally. Pupils equal round and reactive to light bilaterally.  CN V: Facial sensation is normal.  CN VII: Full and symmetric facial movement.  CN VIII: Hearing is normal.  CN IX, X: Palate elevates symmetrically.   CN XI: Shoulder shrug strength is normal.  CN  XII: Tongue midline without atrophy or fasciculations.    Motor:   Strength 5/5 in all 4 extremities. Bulk/tone - normal.  Fasiculations - none    Sensory:   Sensation intact to soft touch in all 4 extremities.    Cerebellar:   Finger-to-nose intact    Reflexes: 2+ in all 4 extremities  Pathologic reflexes - babinski reflex negative    Gait:   Normal gait     Review of Systems:     ROS:  Review of Systems   Constitutional:  Positive for fatigue. Negative for appetite change and fever.   HENT: Negative.  Negative for hearing loss, tinnitus, trouble swallowing and voice change.    Eyes: Negative.  Negative for photophobia, pain and visual disturbance.   Respiratory: Negative.  Negative for shortness of breath.    Cardiovascular: Negative.  Negative for palpitations.   Gastrointestinal: Negative.  Negative for nausea and vomiting.   Endocrine: Negative.  Negative for cold intolerance.   Genitourinary: Negative.  Negative for dysuria, frequency and urgency.   Musculoskeletal:  Positive for neck pain. Negative for back pain, gait problem and myalgias.   Skin: Negative.  Negative for rash.   Allergic/Immunologic: Negative.    Neurological:  Positive for dizziness (Slight), speech difficulty (Finding words) and light-headedness. Negative for tremors, seizures, syncope, facial asymmetry, weakness, numbness and headaches.   Hematological: Negative.  Does not bruise/bleed easily.   Psychiatric/Behavioral: Negative.  Negative for confusion, hallucinations and sleep disturbance.       I have spent a total time of 53 minutes on 10/26/23 in caring for this patient including Risks and benefits of tx options, Instructions for management, Patient and family education, Risk factor reductions, Impressions, Documenting in the medical record, Reviewing / ordering tests, medicine, procedures  , and Obtaining or reviewing history  .

## 2023-10-26 NOTE — PATIENT INSTRUCTIONS
Patient Instructions:  -please the blood work I have ordered for workup of your fatigue  -please keep your schedule appointment with ENT   -follow up with neurology as needed for persistent or worsening symptoms

## 2023-10-31 ENCOUNTER — OFFICE VISIT (OUTPATIENT)
Dept: PHYSICAL THERAPY | Facility: CLINIC | Age: 63
End: 2023-10-31

## 2023-10-31 ENCOUNTER — TELEPHONE (OUTPATIENT)
Dept: FAMILY MEDICINE CLINIC | Facility: CLINIC | Age: 63
End: 2023-10-31

## 2023-10-31 DIAGNOSIS — M54.2 NECK PAIN: Primary | ICD-10-CM

## 2023-10-31 DIAGNOSIS — R42 DIZZINESS: ICD-10-CM

## 2023-10-31 DIAGNOSIS — R29.3 POOR POSTURE: ICD-10-CM

## 2023-10-31 PROCEDURE — 97161 PT EVAL LOW COMPLEX 20 MIN: CPT | Performed by: PHYSICAL THERAPIST

## 2023-10-31 NOTE — TELEPHONE ENCOUNTER
Called patient, LVM on normal results.  Advised we are not the ordering physician, it was ordered by neurology, but everything appears normal

## 2023-10-31 NOTE — TELEPHONE ENCOUNTER
Patient called she got blood work done at [facility] 10/26/23 and wants PCP to take a look at the results and say what they mean, is everything normal or are there any abnormalities in the blood work ?

## 2023-10-31 NOTE — TELEPHONE ENCOUNTER
Patient called this morning to state that she will be stopping the antibiotic 2 days early as she feels that it is causing her dizziness.

## 2023-10-31 NOTE — PROGRESS NOTES
PT Evaluation     Today's date: 10/31/2023  Patient name: Kerrie Trujillo  : 1960  MRN: [id number]  Referring provider: Jordana Escudero PT  Dx:   Encounter Diagnosis     ICD-10-CM    1. Neck pain  M54.2       2. Dizziness  R42       3. Poor posture  R29.3                      Assessment  Assessment details: Kerrie Trujillo is a 61 y.o. female presenting to outpatient physical therapy at [facility] with complaints of onset of dizziness. She presents with decreased range of motion, decreased strength, limited flexibility, poor postural awareness, poor body mechanics, altered gait pattern, poor balance, decreased tolerance to activity and decreased functional mobility due to Neck pain  (primary encounter diagnosis), Dizziness, and Poor posture. Therapist discussed diagnosis, prognosis, POC, proper responses to exercises, postural awareness, HEP, and modalities to use at home. She would benefit from skilled PT services in order to address these deficits and reach maximum level of function. Thank you for the referral!   Impairments: abnormal muscle tone, abnormal or restricted ROM, abnormal movement, activity intolerance, impaired physical strength, lacks appropriate home exercise program, pain with function, scapular dyskinesis and poor posture     Symptom irritability: moderateUnderstanding of Dx/Px/POC: good   Prognosis: good    Goals  STGs (in 4 weeks):  1. Pt will report having at least a 50% improvement since I.E.   2. Pt will report having at most a 2/10 pain level with functional mobility. 3. Pt will demonstrate improved CROM in all planes without onset of sxs. LTGs (in 12 weeks):  1. Pt will report having at least a 75% improvement since I.E.  2. Pt will be independent with HEP. 3. Pt will report having overall more energy to perform household activities, chores, and ADLs.      Plan  Patient would benefit from: PT eval and skilled physical therapy  Planned modality interventions: unattended electrical stimulation and TENS  Planned therapy interventions: manual therapy, joint mobilization, nerve gliding, patient education, neuromuscular re-education, strengthening, stretching, therapeutic activities, therapeutic exercise, home exercise program and flexibility  Frequency: 2x week  Plan of Care beginning date: 10/31/2023  Plan of Care expiration date: 2023  Treatment plan discussed with: patient      Subjective Evaluation    History of Present Illness  Mechanism of injury: Pt is a 61year old female who presents with chief complaint of dizziness, headaches, and facial/head pressure that began over the summer when she was first diagnosed with bronchitis. She then had an upper respiratory infection and was placed on steroids in the middle of September. She reports that her cough symptoms from the infection improved however she has ongoing constant dizziness as well as headache. She was recently diagnosed with a R sided ear infection and was told she has fluid on her ear so she was prescribed antiobiotics. She recently stopped the antiobiotics at day 11 because side effects show dizziness. Quality of life: good    Patient Goals  Patient goals for therapy: decreased pain, improved balance, increased motion, increased strength, independence with ADLs/IADLs and return to sport/leisure activities    Pain  Current pain ratin  At best pain ratin  At worst pain rating: 10  Quality: dizziness, headache, pressure. Alleviating factors: exercise. Exacerbated by: constant, decreased energy/gardening.   Progression: worsening      Objective    Observation/Posture: slight FHP and rounded shoulders    Cervical Screen  (*=pain)  (**=symptom provocation)    AROM: (deg)  Flexion: 40  Extension: 50  R Rot: 70  L Rot: 55  R Lat Flex: 20  L Lat Flex: 25    Palpation/Tenderness: TTPR B UT, LS and cervical paraspinals    Joint Mobility: decreased PA glides in cervical and thoracic spine    Strength: (MMT)    R  L  S' Flex  4/5  4/5  S' ABD  4/5  4/5  S' ER  4-/5  4-/5  S' IR  4/5  4/5    Visual-Occular and Vestibular Testing:  Smooth pursuits: negative   Convergence: negative  Saccades: negative  VORx1: 30 sec x 1 (horiz and vert)  Sharon-Hallpike: (R) negative (L) negative      Special Tests  Vertebral Artery: (R) negative (L) negative         Precautions: standard:    HEP: chin tucks, scap retraction    Specialty Daily Treatment Diary       Manual 11/1       SOR SMF       TPR/STM B UT, LS, cervical paraspinals SMF       B UT stretch        B LS stretch                                        Exercise Diary         UBE                Chin tucks 2 sec x 10               TB Scap Retraction No TB: 2 sec x 10       TB B S' Ext                TB B ER        TB Horiz ABD                Seated thoracic extension                Chicken wings                SNAGs Rotation        SNAGs Extension                                                        HEP/EDU Diagnosis, prognosis, POC, proper responses to exercises, HEP       Modalities

## 2023-11-01 ENCOUNTER — TELEPHONE (OUTPATIENT)
Dept: NEUROLOGY | Facility: CLINIC | Age: 63
End: 2023-11-01

## 2023-11-01 NOTE — TELEPHONE ENCOUNTER
Recd vm 10/30 taken off 11/1    my name is Ceasar Carney, my number is [phone]. I'm calling about results from my blood work. You can call me back, I appreciate it. Thank you.  _____________  Last visit Dr. Cassandra Croft, 10/26  labs dated 10/26 now resulted. Please review and provide recommendation, thank you.

## 2023-11-02 NOTE — TELEPHONE ENCOUNTER
Lmom to let the patient know that her blood work was normal and if she had any questions or concerns to call us back

## 2023-11-03 ENCOUNTER — OFFICE VISIT (OUTPATIENT)
Dept: PHYSICAL THERAPY | Facility: CLINIC | Age: 63
End: 2023-11-03

## 2023-11-03 DIAGNOSIS — M54.2 NECK PAIN: Primary | ICD-10-CM

## 2023-11-03 DIAGNOSIS — R42 DIZZINESS: ICD-10-CM

## 2023-11-03 DIAGNOSIS — R29.3 POOR POSTURE: ICD-10-CM

## 2023-11-03 PROCEDURE — 97110 THERAPEUTIC EXERCISES: CPT | Performed by: PHYSICAL THERAPIST

## 2023-11-03 PROCEDURE — 97140 MANUAL THERAPY 1/> REGIONS: CPT | Performed by: PHYSICAL THERAPIST

## 2023-11-03 NOTE — PROGRESS NOTES
Daily Note     Today's date: 11/3/2023  Patient name: Avani Mednia  : 1960  MRN: [id number]  Referring provider: Fawad Navarro, PT  Dx:   Encounter Diagnosis     ICD-10-CM    1. Neck pain  M54.2       2. Dizziness  R42       3. Poor posture  R29.3                      Subjective: She reports that she does feel better after last visit. She was seen by a massage therapist which also helped her sxs. Objective: See treatment diary below      Assessment: Tolerated treatment well; initiated POC with MT after receiving consent from pt. Vcs provided on proper sequencing with exercises; added chin tucks in supine with MH and added SNAGs rotation today. Patient would benefit from continued PT      Plan: Continue per plan of care.       Precautions: standard:    HEP: chin tucks, scap retraction    Specialty Daily Treatment Diary       Manual 11/1 11/3      SOR SMF SMF      TPR/STM B UT, LS, cervical paraspinals SMF SMF ea      B UT stretch        B LS stretch  SMF              STM temporalis and frontalis  SMF ea                      Exercise Diary         UBE                Chin tucks 2 sec x 10 2 sec x 20              TB Scap Retraction No TB: 2 sec x 10       TB B S' Ext                TB B ER        TB Horiz ABD                Seated thoracic extension                Chicken wings                SNAGs Rotation  2 sec x 10 ea      SNAGs Extension                                                        HEP/EDU Diagnosis, prognosis, POC, proper responses to exercises, HEP Updated with HEP      Modalities          5 mins with chin tucks                         Skin checks performed pre and post application: intact

## 2023-11-08 ENCOUNTER — TELEPHONE (OUTPATIENT)
Dept: FAMILY MEDICINE CLINIC | Facility: CLINIC | Age: 63
End: 2023-11-08

## 2023-11-08 ENCOUNTER — OFFICE VISIT (OUTPATIENT)
Dept: PHYSICAL THERAPY | Facility: CLINIC | Age: 63
End: 2023-11-08

## 2023-11-08 ENCOUNTER — HOSPITAL ENCOUNTER (EMERGENCY)
Facility: HOSPITAL | Age: 63
Discharge: HOME | End: 2023-11-09
Attending: EMERGENCY MEDICINE
Payer: COMMERCIAL

## 2023-11-08 VITALS
WEIGHT: 137 LBS | OXYGEN SATURATION: 97 % | SYSTOLIC BLOOD PRESSURE: 149 MMHG | TEMPERATURE: 97.6 F | HEART RATE: 77 BPM | BODY MASS INDEX: 26.9 KG/M2 | RESPIRATION RATE: 19 BRPM | DIASTOLIC BLOOD PRESSURE: 65 MMHG | HEIGHT: 60 IN

## 2023-11-08 DIAGNOSIS — R51.9 HEADACHE: Primary | ICD-10-CM

## 2023-11-08 DIAGNOSIS — R42 DIZZINESS: ICD-10-CM

## 2023-11-08 DIAGNOSIS — R29.3 POOR POSTURE: ICD-10-CM

## 2023-11-08 DIAGNOSIS — M54.2 NECK PAIN: Primary | ICD-10-CM

## 2023-11-08 LAB
BASOPHILS # BLD AUTO: 0.08 THOUSANDS/ÂΜL (ref 0–0.1)
BASOPHILS NFR BLD AUTO: 1 % (ref 0–1)
EOSINOPHIL # BLD AUTO: 0.23 THOUSAND/ÂΜL (ref 0–0.61)
EOSINOPHIL NFR BLD AUTO: 3 % (ref 0–6)
ERYTHROCYTE [DISTWIDTH] IN BLOOD BY AUTOMATED COUNT: 12.8 % (ref 11.6–15.1)
FLUAV RNA RESP QL NAA+PROBE: NEGATIVE
FLUBV RNA RESP QL NAA+PROBE: NEGATIVE
HCT VFR BLD AUTO: 38.4 % (ref 34.8–46.1)
HGB BLD-MCNC: 12.6 G/DL (ref 11.5–15.4)
IMM GRANULOCYTES # BLD AUTO: 0.03 THOUSAND/UL (ref 0–0.2)
IMM GRANULOCYTES NFR BLD AUTO: 0 % (ref 0–2)
LYMPHOCYTES # BLD AUTO: 4.14 THOUSANDS/ÂΜL (ref 0.6–4.47)
LYMPHOCYTES NFR BLD AUTO: 45 % (ref 14–44)
MCH RBC QN AUTO: 28.6 PG (ref 26.8–34.3)
MCHC RBC AUTO-ENTMCNC: 32.8 G/DL (ref 31.4–37.4)
MCV RBC AUTO: 87 FL (ref 82–98)
MONOCYTES # BLD AUTO: 0.58 THOUSAND/ÂΜL (ref 0.17–1.22)
MONOCYTES NFR BLD AUTO: 7 % (ref 4–12)
NEUTROPHILS # BLD AUTO: 3.91 THOUSANDS/ÂΜL (ref 1.85–7.62)
NEUTS SEG NFR BLD AUTO: 44 % (ref 43–75)
NRBC BLD AUTO-RTO: 0 /100 WBCS
PLATELET # BLD AUTO: 355 THOUSANDS/UL (ref 149–390)
PMV BLD AUTO: 9.3 FL (ref 8.9–12.7)
RBC # BLD AUTO: 4.4 MILLION/UL (ref 3.81–5.12)
RSV RNA RESP QL NAA+PROBE: NEGATIVE
SARS-COV-2 RNA RESP QL NAA+PROBE: NEGATIVE
WBC # BLD AUTO: 8.97 THOUSAND/UL (ref 4.31–10.16)

## 2023-11-08 PROCEDURE — 86140 C-REACTIVE PROTEIN: CPT | Performed by: EMERGENCY MEDICINE

## 2023-11-08 PROCEDURE — 84145 PROCALCITONIN (PCT): CPT | Performed by: EMERGENCY MEDICINE

## 2023-11-08 PROCEDURE — 80053 COMPREHEN METABOLIC PANEL: CPT | Performed by: EMERGENCY MEDICINE

## 2023-11-08 PROCEDURE — 99284 EMERGENCY DEPT VISIT MOD MDM: CPT

## 2023-11-08 PROCEDURE — 85610 PROTHROMBIN TIME: CPT | Performed by: EMERGENCY MEDICINE

## 2023-11-08 PROCEDURE — 96360 HYDRATION IV INFUSION INIT: CPT

## 2023-11-08 PROCEDURE — 85730 THROMBOPLASTIN TIME PARTIAL: CPT | Performed by: EMERGENCY MEDICINE

## 2023-11-08 PROCEDURE — 99284 EMERGENCY DEPT VISIT MOD MDM: CPT | Performed by: EMERGENCY MEDICINE

## 2023-11-08 PROCEDURE — 97110 THERAPEUTIC EXERCISES: CPT | Performed by: PHYSICAL THERAPIST

## 2023-11-08 PROCEDURE — 85025 COMPLETE CBC W/AUTO DIFF WBC: CPT | Performed by: EMERGENCY MEDICINE

## 2023-11-08 PROCEDURE — 36415 COLL VENOUS BLD VENIPUNCTURE: CPT | Performed by: EMERGENCY MEDICINE

## 2023-11-08 PROCEDURE — 97140 MANUAL THERAPY 1/> REGIONS: CPT | Performed by: PHYSICAL THERAPIST

## 2023-11-08 PROCEDURE — 86618 LYME DISEASE ANTIBODY: CPT | Performed by: EMERGENCY MEDICINE

## 2023-11-08 PROCEDURE — 0241U HB NFCT DS VIR RESP RNA 4 TRGT: CPT | Performed by: EMERGENCY MEDICINE

## 2023-11-08 PROCEDURE — 85652 RBC SED RATE AUTOMATED: CPT | Performed by: EMERGENCY MEDICINE

## 2023-11-08 RX ADMIN — SODIUM CHLORIDE 1000 ML: 0.9 INJECTION, SOLUTION INTRAVENOUS at 23:52

## 2023-11-08 NOTE — PROGRESS NOTES
Daily Note     Today's date: 2023  Patient name: Howard Parry  : 1960  MRN: [id number]  Referring provider: Marquise Jean, PT  Dx:   Encounter Diagnosis     ICD-10-CM    1. Neck pain  M54.2       2. Dizziness  R42       3. Poor posture  R29.3                      Subjective: She reports that she was feeling really good on Saturday. She did a lot of work outside and then Monday and yesterday as well as today she feels much worse. She picked up two big rocks and unsure if it flared her up. She also saw ENT who reports that she does not have sinus infections or ear infections. Objective: See treatment diary below      Assessment: Tolerated treatment well; MT performed after receiving consent from pt; she continues to have increased tone with cervical and thoracic musculature and decreased joint mobility. Reviewed HEP and discussed TMJ therapy with patient which she agreed. Discussed posture with sleeping and pillows as well as night guards for her clenching and grinding. With mandibular depression, noted repeated crepitus however denies pain. Concluded with . Patient would benefit from continued PT      Plan: Continue per plan of care.       Precautions: standard:    HEP: chin tucks, scap retraction    Specialty Daily Treatment Diary       Manual 11/1 11/3      SOR SMF SMF      TPR/STM B UT, LS, cervical paraspinals SMF SMF ea      B UT stretch        B LS stretch  SMF              STM temporalis and frontalis  SMF ea                      Exercise Diary         UBE                Chin tucks 2 sec x 10 2 sec x 20              TB Scap Retraction No TB: 2 sec x 10       TB B S' Ext                TB B ER        TB Horiz ABD                Seated thoracic extension                Chicken wings                SNAGs Rotation  2 sec x 10 ea      SNAGs Extension                                                        HEP/EDU Diagnosis, prognosis, POC, proper responses to exercises, HEP Updated with HEP Modalities        MH  5 mins with chin tucks                         Skin checks performed pre and post application: intact

## 2023-11-08 NOTE — TELEPHONE ENCOUNTER
Patient Requesting MRI head and neck. Patient has been evaluated in our office for headaches. Per patient MRI will be complete Through [facility].    Patient has seen ENT, currently on PT and they stated patient has neck trauma. Patient stated PT work for her some how but issues is not solve.    Please advised  Patient asking id really necessary OV if she has come multiple times for same issue.

## 2023-11-09 ENCOUNTER — APPOINTMENT (EMERGENCY)
Dept: CT IMAGING | Facility: HOSPITAL | Age: 63
End: 2023-11-09
Payer: COMMERCIAL

## 2023-11-09 LAB
ALBUMIN SERPL BCP-MCNC: 4.2 G/DL (ref 3.5–5)
ALP SERPL-CCNC: 52 U/L (ref 34–104)
ALT SERPL W P-5'-P-CCNC: 16 U/L (ref 7–52)
ANION GAP SERPL CALCULATED.3IONS-SCNC: 5 MMOL/L
APTT PPP: 25 SECONDS (ref 23–37)
AST SERPL W P-5'-P-CCNC: 17 U/L (ref 13–39)
B BURGDOR IGG+IGM SER QL IA: NEGATIVE
BILIRUB SERPL-MCNC: 0.38 MG/DL (ref 0.2–1)
BUN SERPL-MCNC: 20 MG/DL (ref 5–25)
CALCIUM SERPL-MCNC: 9.3 MG/DL (ref 8.4–10.2)
CHLORIDE SERPL-SCNC: 107 MMOL/L (ref 96–108)
CO2 SERPL-SCNC: 28 MMOL/L (ref 21–32)
CREAT SERPL-MCNC: 0.71 MG/DL (ref 0.6–1.3)
CRP SERPL QL: 1.5 MG/L
ERYTHROCYTE [SEDIMENTATION RATE] IN BLOOD: 2 MM/HOUR (ref 0–29)
GFR SERPL CREATININE-BSD FRML MDRD: 90 ML/MIN/1.73SQ M
GLUCOSE SERPL-MCNC: 98 MG/DL (ref 65–140)
INR PPP: 0.93 (ref 0.84–1.19)
POTASSIUM SERPL-SCNC: 3.8 MMOL/L (ref 3.5–5.3)
PROCALCITONIN SERPL-MCNC: <0.05 NG/ML
PROT SERPL-MCNC: 6.6 G/DL (ref 6.4–8.4)
PROTHROMBIN TIME: 12.8 SECONDS (ref 11.6–14.5)
SODIUM SERPL-SCNC: 140 MMOL/L (ref 135–147)

## 2023-11-09 PROCEDURE — 70498 CT ANGIOGRAPHY NECK: CPT

## 2023-11-09 PROCEDURE — 70496 CT ANGIOGRAPHY HEAD: CPT

## 2023-11-09 PROCEDURE — 96361 HYDRATE IV INFUSION ADD-ON: CPT

## 2023-11-09 RX ADMIN — IOHEXOL 85 ML: 350 INJECTION, SOLUTION INTRAVENOUS at 00:52

## 2023-11-09 NOTE — ED CARE HANDOFF
Emergency Department Sign Out Note        Sign out and transfer of care from Dr. Duy Faria. See Separate Emergency Department note. The patient, Oksana Villalobos, was evaluated by the previous provider for HA. Workup Completed:  CTA head and neck with and without contrast   Final Result      1. No acute intracranial hemorrhage, midline shift, or mass effect. 2.  No hemodynamically significant stenosis of the arteries of the neck. 3.  No high-grade proximal stenosis of the visualized [place].    4.  No significant intracranial arteriovenous malformation or aneurysm      Workstation performed: [id number]            Labs Reviewed   CBC AND DIFFERENTIAL - Abnormal       Result Value Ref Range Status    WBC 8.97  4.31 - 10.16 Thousand/uL Final    RBC 4.40  3.81 - 5.12 Million/uL Final    Hemoglobin 12.6  11.5 - 15.4 g/dL Final    Hematocrit 38.4  34.8 - 46.1 % Final    MCV 87  82 - 98 fL Final    MCH 28.6  26.8 - 34.3 pg Final    MCHC 32.8  31.4 - 37.4 g/dL Final    RDW 12.8  11.6 - 15.1 % Final    MPV 9.3  8.9 - 12.7 fL Final    Platelets 916  985 - 390 Thousands/uL Final    nRBC 0  /100 WBCs Final    Neutrophils Relative 44  43 - 75 % Final    Immat GRANS % 0  0 - 2 % Final    Lymphocytes Relative 45 (*) 14 - 44 % Final    Monocytes Relative 7  4 - 12 % Final    Eosinophils Relative 3  0 - 6 % Final    Basophils Relative 1  0 - 1 % Final    Neutrophils Absolute 3.91  1.85 - 7.62 Thousands/µL Final    Immature Grans Absolute 0.03  0.00 - 0.20 Thousand/uL Final    Lymphocytes Absolute 4.14  0.60 - 4.47 Thousands/µL Final    Monocytes Absolute 0.58  0.17 - 1.22 Thousand/µL Final    Eosinophils Absolute 0.23  0.00 - 0.61 Thousand/µL Final    Basophils Absolute 0.08  0.00 - 0.10 Thousands/µL Final   COVID19, INFLUENZA A/B, RSV PCR, [facility] - Normal    SARS-CoV-2 Negative  Negative Final    INFLUENZA A PCR Negative  Negative Final    INFLUENZA B PCR Negative  Negative Final    RSV PCR Negative  Negative Final Narrative:     FOR PEDIATRIC PATIENTS - copy/paste COVID Guidelines URL to browser: [email or web address]    SARS-CoV-2 assay is a Nucleic Acid Amplification assay intended for the  qualitative detection of nucleic acid from SARS-CoV-2 in nasopharyngeal  swabs. Results are for the presumptive identification of SARS-CoV-2 RNA. Positive results are indicative of infection with SARS-CoV-2, the virus  causing COVID-19, but do not rule out bacterial infection or co-infection  with other viruses. Laboratories within the [facility] and its  territories are required to report all positive results to the appropriate  public health authorities. Negative results do not preclude SARS-CoV-2  infection and should not be used as the sole basis for treatment or other  patient management decisions. Negative results must be combined with  clinical observations, patient history, and epidemiological information. This test has not been FDA cleared or approved. This test has been authorized by FDA under an Emergency Use Authorization  (EUA). This test is only authorized for the duration of time the  declaration that circumstances exist justifying the authorization of the  emergency use of an in vitro diagnostic tests for detection of SARS-CoV-2  virus and/or diagnosis of COVID-19 infection under section 564(b)(1) of  the Act, 21 U. S.C. 862DCV-6(N)(2), unless the authorization is terminated  or revoked sooner. The test has been validated but independent review by FDA  and CLIA is pending. Test performed using [employer] GeneXpert: This RT-PCR assay targets N2,  a region unique to SARS-CoV-2. A conserved region in the E-gene was chosen  for pan-Sarbecovirus detection which includes SARS-CoV-2. According to CMS-2020-01-R, this platform meets the definition of high-throughput technology.    PROTIME-INR - Normal    Protime 12.8  11.6 - 14.5 seconds Final    INR 0.93  0.84 - 1.19 Final   APTT - Normal    PTT 25  23 - 37 seconds Final    Comment: Therapeutic Heparin Range =  60-90 seconds   C-REACTIVE PROTEIN - Normal    CRP 1.5  <3.0 mg/L Final   PROCALCITONIN TEST - Normal    Procalcitonin <0.05  <=0.25 ng/ml Final    Comment: Suspected Lower Respiratory Tract Infection (LRTI):  - LESS than or EQUAL to 0.25 ng/mL:   low likelihood for bacterial LRTI; antibiotics DISCOURAGED.  - GREATER than 0.25 ng/mL:   increased likelihood for bacterial LRTI; antibiotics ENCOURAGED. Suspected Sepsis:  - Strongly consider initiating antibiotics in ALL UNSTABLE patients. - LESS than or EQUAL to 0.5 ng/mL:   low likelihood for bacterial sepsis; antibiotics DISCOURAGED.  - GREATER than 0.5 ng/mL:   increased likelihood for bacterial sepsis; antibiotics ENCOURAGED.  - GREATER than 2 ng/mL:   high risk for severe sepsis / septic shock; antibiotics strongly ENCOURAGED. Decisions on antibiotic use should not be based solely on Procalcitonin (PCT) levels. If PCT is low but uncertainty exists with stopping antibiotics, repeat PCT in 6-24 hours to confirm the low level. If antibiotics are administered (regardless if initial PCT was high or low), repeat PCT every 1-2 days to consider early antibiotic cessation (when GREATER than 80% decrease from the peak OR when PCT drops below designated cutoffs, whichever comes first), so long as the infection is NOT one that typically requires prolonged treatment durations (e.g., bone/joint infections, endocarditis, Staph. aureus bacteremia).     Situations of FALSE-POSITIVE Procalcitonin values:  1) Newborns < 67 hours old  2) Massive stress from severe trauma / burns, major surgery, acute pancreatitis, cardiogenic / hemorrhagic shock, sickle cell crisis, or other organ perfusion abnormalities  3) Malaria and some Candidal infections  4) Treatment with agents that stimulate cytokines (e.g., OKT3, anti-lymphocyte globulins, alemtuzumab, IL-2, granulocyte transfusion [NOT GCSFs])  5) Chronic renal disease causes elevated baseline levels (consider GREATER than 0.75 ng/mL as an abnormal cut-off); initiating HD/CRRT may cause transient decreases  6) Paraneoplastic syndromes from medullary thyroid or SCLC, some forms of vasculitis, and acute guzez-ln-rdbl disease    Situations of FALSE-NEGATIVE Procalcitonin values:  1) Too early in clinical course for PCT to have reached its peak (may repeat in 6-24 hours to confirm low level)  2) Localized infection WITHOUT systemic (SIRS / sepsis) response (e.g., an abscess, osteomyelitis, cystitis)  3) Mycobacteria (e.g., Tuberculosis, MAC)  4) Cystic fibrosis exacerbations     SEDIMENTATION RATE - Normal    Sed Rate 2  0 - 29 mm/hour Final   COMPREHENSIVE METABOLIC PANEL    Sodium 871  135 - 147 mmol/L Final    Potassium 3.8  3.5 - 5.3 mmol/L Final    Chloride 107  96 - 108 mmol/L Final    CO2 28  21 - 32 mmol/L Final    ANION GAP 5  mmol/L Final    BUN 20  5 - 25 mg/dL Final    Creatinine 0.71  0.60 - 1.30 mg/dL Final    Comment: Standardized to IDMS reference method    Glucose 98  65 - 140 mg/dL Final    Comment: If the patient is fasting, the ADA then defines impaired fasting glucose as > 100 mg/dL and diabetes as > or equal to 123 mg/dL. Calcium 9.3  8.4 - 10.2 mg/dL Final    AST 17  13 - 39 U/L Final    ALT 16  7 - 52 U/L Final    Comment: Specimen collection should occur prior to Sulfasalazine administration due to the potential for falsely depressed results. Alkaline Phosphatase 52  34 - 104 U/L Final    Total Protein 6.6  6.4 - 8.4 g/dL Final    Albumin 4.2  3.5 - 5.0 g/dL Final    Total Bilirubin 0.38  0.20 - 1.00 mg/dL Final    Comment: Use of this assay is not recommended for patients undergoing treatment with eltrombopag due to the potential for falsely elevated results.   N-acetyl-p-benzoquinone imine (metabolite of Acetaminophen) will generate erroneously low results in samples for patients that have taken an overdose of Acetaminophen. eGFR 90  ml/min/1.73sq m Final    Narrative:     [facility] guidelines for Chronic Kidney Disease (CKD):     Stage 1 with normal or high GFR (GFR > 90 mL/min/1.73 square meters)    Stage 2 Mild CKD (GFR = 60-89 mL/min/1.73 square meters)    Stage 3A Moderate CKD (GFR = 45-59 mL/min/1.73 square meters)    Stage 3B Moderate CKD (GFR = 30-44 mL/min/1.73 square meters)    Stage 4 Severe CKD (GFR = 15-29 mL/min/1.73 square meters)    Stage 5 End Stage CKD (GFR <15 mL/min/1.73 square meters)  Note: GFR calculation is accurate only with a steady state creatinine   LYME TOTAL AB W REFLEX TO IGM/IGG    Narrative: The following orders were created for panel order Lyme Total AB W Reflex to IGM/IGG. Procedure                               Abnormality         Status                     ---------                               -----------         ------                     Lyme Total AB W Reflex jackelin Mcnulty .[[id number]]                      In process                   Please view results for these tests on the individual orders. LYME TOTAL AB W REFLEX TO IGM/IGG        ED Course / Workup Pending (followup):               Stroke Assessment       Row Name 11/08/23 7407             NIH Stroke Scale    Interval Baseline      Level of Consciousness (1a.) 0      LOC Questions (1b.) 0      LOC Commands (1c.) 0      Best Gaze (2.) 0      Visual (3.) 0      Facial Palsy (4.) 0      Motor Arm, Left (5a.) 0      Motor Arm, Right (5b.) 0      Motor Leg, Left (6a.) 0      Motor Leg, Right (6b.) 0      Limb Ataxia (7.) 0      Sensory (8.) 0      Best Language (9.) 0      Dysarthria (10.) 0      Extinction and Inattention (11.) (Formerly Neglect) 0      Total 0                                        ED Course as of 11/09/23 0138   Thu Nov 09, 2023   0025 S/o from Dr. Kay Rey. CTA pend. Global HA, neck pain, dizziness x 2months. ENT, neuro, PCP angela'mahnaz. Has been on abx/steroids. Exam benign. Nontoxic.  Neuro intact. If imaging neg, can d/c home to f/u with neuro. 6241 CTA neg. Discussed w/ patient. RTED if sx worsen, otherwise f/u with neuro/PCP. Procedures  Medical Decision Making  Problems Addressed:  Headache: acute illness or injury    Amount and/or Complexity of Data Reviewed  External Data Reviewed: notes. Labs:  Decision-making details documented in ED Course. Radiology:  Decision-making details documented in ED Course. Risk  Prescription drug management. Disposition  Final diagnoses:   Headache     Time reflects when diagnosis was documented in both MDM as applicable and the Disposition within this note       Time User Action Codes Description Comment    11/8/2023 11:44 PM Raegan Donaldson Add [R51.9] Headache           ED Disposition       ED Disposition   Discharge    Condition   Stable    Date/Time   Thu Nov 9, 2023  1:36 AM    Comment   Homero Fischer discharge to home/self care. Follow-up Information       Follow up With Specialties Details Why Contact Info Additional Information    Ashtyn Tolentino,  Family Medicine Schedule an appointment as soon as possible for a visit   [address] [facility] [phone]        [address] [place] [address] Emergency Department Emergency Medicine  If symptoms worsen [address] [facility] [phone]  [address] [facility] Emergency Department, [address] [facility], [address] [place] [address] [address]          Patient's Medications   Discharge Prescriptions    No medications on file     No discharge procedures on file.        ED Provider  Electronically Signed by     Remy Diamond MD  11/09/23 1435

## 2023-11-09 NOTE — TELEPHONE ENCOUNTER
Call pt back and say she was going to go through the [facility]  she said she is going to paid for it the MRI it is not going through her insurance say it cost to much,

## 2023-11-09 NOTE — ED PROVIDER NOTES
History  Chief Complaint   Patient presents with    Headache - Recurrent or Known Dx Migraines     Pt presents to the ED with c/o migraine x2 months, has seen neuro, ENT, and PCP. Had multiple dx of ear infection, sinus infection, seen ENT and r/o infections and neuro released her with no findings. Currently in PT for neck trauma from coughing 2 months prior. She states she has no new symptoms but would like an MRI or other scans because she continues to have the headaches. Today had 400mg ibuprofen without relief. This is a 69-year-old female presents for evaluation of global headache neck pain and dizziness that has been waxing and waning over the past 2 months she was seen by primary care physician ENT and neurology. Had outpatient blood work which was unremarkable. Denies any recent trauma or injury. Currently she is awake alert orient x3 with a Wiggins Coma Scale of 15 NIH stroke scale of 0. Has had a recent cough and she has been on antibiotics and steroids in the recent past for what was felt to be sinusitis and otitis media. Denies any change in vision or speech. Currently going to physical therapy for neck pain felt to be induced by coughing. History provided by:  Patient  Medical Problem  Location:  Global headache  Quality:  Throbbing  Severity:  Moderate  Onset quality:  Gradual  Duration:  2 months  Progression:  Waxing and waning  Chronicity:  Chronic  Context:  Global headache with lightheadedness and dizziness  Worsened by:  Nothing in particular  Associated symptoms: cough and headaches        Prior to Admission Medications   Prescriptions Last Dose Informant Patient Reported? Taking?    CALCIUM PO  Self Yes No   Sig: Take by mouth   Cetrorelix Acetate (CETROTIDE SC)   Yes No   Sig: Inject under the skin   MAGNESIUM PO   Yes No   Sig: Take by mouth   Mag Aspart-Potassium Aspart (POTASSIUM & MAGNESIUM ASPARTAT PO)   Yes No   Sig: Take by mouth   Patient not taking: Reported on 10/26/2023   Multiple Vitamins-Minerals (IMMUNE SUPPORT PO)  Self Yes No   Sig: Take by mouth   Probiotic Product (PROBIOTIC DAILY PO)  Self Yes No   Sig: Take by mouth   albuterol (Proventil HFA) 90 mcg/act inhaler   No No   Sig: Inhale 2 puffs every 6 (six) hours as needed for wheezing   Patient not taking: Reported on 10/26/2023   fish oil-omega-3 fatty acids 1000 MG capsule   Yes No   Sig: Take by mouth if needed   fluticasone (FLONASE) 50 mcg/act nasal spray   Yes No   Si spray into each nostril daily      Facility-Administered Medications: None       History reviewed. No pertinent past medical history. Past Surgical History:   Procedure Laterality Date    APPENDECTOMY      KNEE SURGERY Right     tendon       Family History   Problem Relation Age of Onset    Osteoporosis Mother     Glaucoma Mother     Hypertension Father     Hyperlipidemia Father     Glaucoma Father     Ulcers Father     No Known Problems Sister     No Known Problems Daughter     No Known Problems Maternal Grandmother     No Known Problems Maternal Grandfather     No Known Problems Paternal Grandmother     No Known Problems Paternal Grandfather     Lung cancer Maternal Aunt 48    No Known Problems Maternal Aunt     No Known Problems Paternal Aunt     Breast cancer Neg Hx     Colon cancer Neg Hx     Endometrial cancer Neg Hx     Ovarian cancer Neg Hx      I have reviewed and agree with the history as documented. E-Cigarette/Vaping    E-Cigarette Use Never User      E-Cigarette/Vaping Substances    Nicotine No     THC No     CBD No     Flavoring No     Other No     Unknown No      Social History     Tobacco Use    Smoking status: Never    Smokeless tobacco: Never   Vaping Use    Vaping Use: Never used   Substance Use Topics    Alcohol use: Yes     Comment: occ-minimum    Drug use: No       Review of Systems   Respiratory:  Positive for cough. Musculoskeletal:  Positive for neck pain.    Neurological:  Positive for dizziness, light-headedness and headaches. All other systems reviewed and are negative. Physical Exam  Physical Exam  Vitals and nursing note reviewed. Constitutional:       General: She is not in acute distress. Appearance: She is not ill-appearing, toxic-appearing or diaphoretic. HENT:      Head: Normocephalic and atraumatic. Right Ear: Tympanic membrane, ear canal and external ear normal.      Left Ear: Tympanic membrane, ear canal and external ear normal.      Mouth/Throat:      Mouth: Mucous membranes are moist.   Eyes:      General:         Right eye: No discharge. Left eye: No discharge. Extraocular Movements: Extraocular movements intact. Pupils: Pupils are equal, round, and reactive to light. Cardiovascular:      Rate and Rhythm: Normal rate and regular rhythm. Pulses: Normal pulses. Heart sounds: No murmur heard. No friction rub. No gallop. Pulmonary:      Effort: Pulmonary effort is normal. No respiratory distress. Breath sounds: No stridor. No wheezing, rhonchi or rales. Chest:      Chest wall: No tenderness. Abdominal:      General: There is no distension. Palpations: Abdomen is soft. Tenderness: There is no abdominal tenderness. There is no guarding or rebound. Musculoskeletal:         General: No swelling, tenderness, deformity or signs of injury. Normal range of motion. Cervical back: Normal range of motion and neck supple. No rigidity. Right lower leg: No edema. Left lower leg: No edema. Skin:     General: Skin is warm and dry. Coloration: Skin is not jaundiced. Findings: No bruising, erythema or rash. Neurological:      General: No focal deficit present. Mental Status: She is alert and oriented to person, place, and time. Cranial Nerves: No cranial nerve deficit. Sensory: No sensory deficit.       Coordination: Coordination normal.   Psychiatric:         Mood and Affect: Mood normal. Behavior: Behavior normal.         Thought Content: Thought content normal.         Vital Signs  ED Triage Vitals [11/08/23 2036]   Temperature Pulse Respirations Blood Pressure SpO2   97.6 °F (36.4 °C) 77 19 149/65 97 %      Temp Source Heart Rate Source Patient Position - Orthostatic VS BP Location FiO2 (%)   Temporal -- Sitting Left arm --      Pain Score       --           Vitals:    11/08/23 2036   BP: 149/65   Pulse: 77   Patient Position - Orthostatic VS: Sitting         Visual Acuity      ED Medications  Medications   sodium chloride 0.9 % bolus 1,000 mL (1,000 mL Intravenous New Bag 11/8/23 2352)       Diagnostic Studies  Results Reviewed       Procedure Component Value Units Date/Time    CBC and differential [[id number]] Collected: 11/08/23 2351    Lab Status: No result Specimen: Blood from Arm, Left     Protime-INR [[id number]] Collected: 11/08/23 2351    Lab Status: No result Specimen: Blood from Arm, Left     APTT [[id number]] Collected: 11/08/23 2351    Lab Status: No result Specimen: Blood from Arm, Left     Comprehensive metabolic panel [[id number]] Collected: 11/08/23 2351    Lab Status: No result Specimen: Blood from Arm, Left     C-reactive protein [[id number]] Collected: 11/08/23 2351    Lab Status: No result Specimen: Blood from Arm, Left     Procalcitonin [[id number]] Collected: 11/08/23 2351    Lab Status: No result Specimen: Blood from Arm, Left     Sedimentation rate, automated [[id number]] Collected: 11/08/23 2351    Lab Status: No result Specimen: Blood from Arm, Left     Lyme Total AB W Reflex to IGM/IGG [[id number]] Collected: 11/08/23 2351    Lab Status: No result Specimen: Blood from Arm, Left     Narrative: The following orders were created for panel order Lyme Total AB W Reflex to IGM/IGG.   Procedure                               Abnormality         Status                     ---------                               -----------         ------                     Lyme Total AB W Reflex S...[[id number]]                                                   Please view results for these tests on the individual orders. Lyme Total AB W Reflex to IGM/IGG [[id number]] Collected: 11/08/23 2351    Lab Status: No result Specimen: Blood from Arm, Left     COVID/FLU/RSV [[id number]]  (Normal) Collected: 11/08/23 2040    Lab Status: Final result Specimen: Nares from Nose Updated: 11/08/23 2121     SARS-CoV-2 Negative     INFLUENZA A PCR Negative     INFLUENZA B PCR Negative     RSV PCR Negative    Narrative:      FOR PEDIATRIC PATIENTS - copy/paste COVID Guidelines URL to browser: [email or web address]    SARS-CoV-2 assay is a Nucleic Acid Amplification assay intended for the  qualitative detection of nucleic acid from SARS-CoV-2 in nasopharyngeal  swabs. Results are for the presumptive identification of SARS-CoV-2 RNA. Positive results are indicative of infection with SARS-CoV-2, the virus  causing COVID-19, but do not rule out bacterial infection or co-infection  with other viruses. Laboratories within the [facility] and its  territories are required to report all positive results to the appropriate  public health authorities. Negative results do not preclude SARS-CoV-2  infection and should not be used as the sole basis for treatment or other  patient management decisions. Negative results must be combined with  clinical observations, patient history, and epidemiological information. This test has not been FDA cleared or approved. This test has been authorized by FDA under an Emergency Use Authorization  (EUA). This test is only authorized for the duration of time the  declaration that circumstances exist justifying the authorization of the  emergency use of an in vitro diagnostic tests for detection of SARS-CoV-2  virus and/or diagnosis of COVID-19 infection under section 564(b)(1) of  the Act, 21 U. S.C. 915IHN-3(O)(1), unless the authorization is terminated  or revoked sooner. The test has been validated but independent review by FDA  and CLIA is pending. Test performed using [employer] GeneXpert: This RT-PCR assay targets N2,  a region unique to SARS-CoV-2. A conserved region in the E-gene was chosen  for pan-Sarbecovirus detection which includes SARS-CoV-2. According to CMS-2020-01-R, this platform meets the definition of high-throughput technology. CTA head and neck with and without contrast    (Results Pending)              Procedures  Procedures         ED Course                  Stroke Assessment       Row Name 11/08/23 2337             NIH Stroke Scale    Interval Baseline      Level of Consciousness (1a.) 0      LOC Questions (1b.) 0      LOC Commands (1c.) 0      Best Gaze (2.) 0      Visual (3.) 0      Facial Palsy (4.) 0      Motor Arm, Left (5a.) 0      Motor Arm, Right (5b.) 0      Motor Leg, Left (6a.) 0      Motor Leg, Right (6b.) 0      Limb Ataxia (7.) 0      Sensory (8.) 0      Best Language (9.) 0      Dysarthria (10.) 0      Extinction and Inattention (11.) (Formerly Neglect) 0      Total 0                                SBIRT 22yo+      Flowsheet Row Most Recent Value   Initial Alcohol Screen: US AUDIT-C     1. How often do you have a drink containing alcohol? 0 Filed at: 11/08/2023 2039   2. How many drinks containing alcohol do you have on a typical day you are drinking? 0 Filed at: 11/08/2023 2039   3a. Male UNDER 65: How often do you have five or more drinks on one occasion? 0 Filed at: 11/08/2023 2039   3b. FEMALE Any Age, or MALE 65+: How often do you have 4 or more drinks on one occassion? 0 Filed at: 11/08/2023 2039   Audit-C Score 0 Filed at: 11/08/2023 2039   YISEL: How many times in the past year have you. .. Used an illegal drug or used a prescription medication for non-medical reasons?  Never Filed at: 11/08/2023 2039                      Medical Decision Making  Global headache differential includes migraine muscle tension headache or acute CNS lesion we will check CAT scan head and neck in addition to labs for further evaluation    Amount and/or Complexity of Data Reviewed  Labs: ordered. Radiology: ordered. Disposition  Final diagnoses:   Headache     Time reflects when diagnosis was documented in both MDM as applicable and the Disposition within this note       Time User Action Codes Description Comment    11/8/2023 11:44 PM Bhargav Torres Add [R51.9] Headache           ED Disposition       None          Follow-up Information    None         Patient's Medications   Discharge Prescriptions    No medications on file       No discharge procedures on file.     PDMP Review       None            ED Provider  Electronically Signed by             Vanda Lyon DO  11/08/23 4906

## 2023-11-09 NOTE — TELEPHONE ENCOUNTER
Patient called again asking if you are able to put orders in for her for an MRI of her head/neck.  She was seen at the ER yesterday for ongoing symptoms she has been having.    Please advise patient at    [phone]

## 2023-11-10 NOTE — TELEPHONE ENCOUNTER
Yes, this is Ilene Hoffman. It is Friday morning. I was wondering if the doctor authorized my MRI. If you could call me back, I'd like to get that set up sometime today as the weekend will be coming. Thank you, [phone]. Thank you.

## 2023-11-11 ENCOUNTER — TELEPHONE (OUTPATIENT)
Dept: OTHER | Facility: OTHER | Age: 63
End: 2023-11-11

## 2023-11-11 NOTE — TELEPHONE ENCOUNTER
Patient is requesting a call back from the Neurologist regarding a referral to have an MRI done and she will be using the [employer].  She states she has left numerous messages for Dr. Charu Hilliard with no reply and reaching out to neurology for help

## 2023-11-13 ENCOUNTER — TELEPHONE (OUTPATIENT)
Dept: FAMILY MEDICINE CLINIC | Facility: CLINIC | Age: 63
End: 2023-11-13

## 2023-11-13 DIAGNOSIS — G89.29 CHRONIC NECK PAIN: Primary | ICD-10-CM

## 2023-11-13 DIAGNOSIS — M54.2 CHRONIC NECK PAIN: Primary | ICD-10-CM

## 2023-11-13 DIAGNOSIS — G44.52 NEW DAILY PERSISTENT HEADACHE: ICD-10-CM

## 2023-11-13 DIAGNOSIS — R42 DIZZINESS: ICD-10-CM

## 2023-11-13 NOTE — TELEPHONE ENCOUNTER
Hello,     I have called the patient back, she stated that her Family Doctor has ordered the MRI already.     Thank you,     Cristo Aguirre

## 2023-11-14 NOTE — TELEPHONE ENCOUNTER
received vm from 11/13 at 11:07am-Yes, hi, my name is Samantha perry. Just wondering if the doctor has approved my MRI or called it or whatever they do. This is the 3rd time I've called. My birthday is March 23rd 1960. You can get back to me. [phone].  Thank you very much.  ------------------------------------------------  Already addressed

## 2023-11-14 NOTE — TELEPHONE ENCOUNTER
received vm from 11/13 at 8:35am- Good morning. My name is Ervin Hanson, 1960.  [phone]. I called over the weekend and I was wondering if the doctor got my message about scheduling an MRI for me. I've seen her, she said, if I keep having problems, she will schedule. So if you could call me back, I appreciate it, just to let me know if its been approved and that she got the message.  Thank you.  -------------------------------------------------------  Already addressed

## 2023-11-15 ENCOUNTER — OFFICE VISIT (OUTPATIENT)
Dept: PHYSICAL THERAPY | Facility: CLINIC | Age: 63
End: 2023-11-15

## 2023-11-15 DIAGNOSIS — R51.9 NONINTRACTABLE HEADACHE, UNSPECIFIED CHRONICITY PATTERN, UNSPECIFIED HEADACHE TYPE: ICD-10-CM

## 2023-11-15 DIAGNOSIS — F45.8 BRUXISM: ICD-10-CM

## 2023-11-15 DIAGNOSIS — M54.2 NECK PAIN: Primary | ICD-10-CM

## 2023-11-15 DIAGNOSIS — R29.3 POOR POSTURE: ICD-10-CM

## 2023-11-15 DIAGNOSIS — R42 DIZZINESS: ICD-10-CM

## 2023-11-15 PROCEDURE — 97164 PT RE-EVAL EST PLAN CARE: CPT | Performed by: PHYSICAL THERAPIST
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
## 2023-11-16 PROBLEM — J01.00 ACUTE NON-RECURRENT MAXILLARY SINUSITIS: Status: RESOLVED | Noted: 2023-09-17 | Resolved: 2023-11-16

## 2023-11-17 ENCOUNTER — OFFICE VISIT (OUTPATIENT)
Dept: PHYSICAL THERAPY | Facility: CLINIC | Age: 63
End: 2023-11-17

## 2023-11-17 DIAGNOSIS — M54.2 NECK PAIN: Primary | ICD-10-CM

## 2023-11-17 DIAGNOSIS — R29.3 POOR POSTURE: ICD-10-CM

## 2023-11-17 DIAGNOSIS — R42 DIZZINESS: ICD-10-CM

## 2023-11-17 PROCEDURE — 97140 MANUAL THERAPY 1/> REGIONS: CPT | Performed by: PHYSICAL THERAPIST

## 2023-11-17 PROCEDURE — 97110 THERAPEUTIC EXERCISES: CPT | Performed by: PHYSICAL THERAPIST

## 2023-11-17 NOTE — PROGRESS NOTES
Daily Note     Today's date: 2023  Patient name: Lupe Cotton  : 1960  MRN: [id number]  Referring provider: Carmina Rodriguez, PT  Dx:   Encounter Diagnosis     ICD-10-CM    1. Neck pain  M54.2       2. Dizziness  R42       3. Poor posture  R29.3                      Subjective: She reports that she woke up this morning and felt good but then when she was up she noticed having some symptoms. She reports feeling fine after last visit. She reports having low back pain today and is thinking of going to chiropractor. Objective: See treatment diary below    Assessment: Tolerated treatment well; initiated POC with MH to low back due to pain. MT performed after receiving consent form pt; she reports having some soreness but denies having increased pain. Vcs provided on proper sequencing with exercises; added guppies today and reviewed HEP. Patient demonstrated fatigue post treatment and would benefit from continued PT      Plan: Continue per plan of care.       Precautions: standard:    HEP: chin tucks, scap retraction    Specialty Daily Treatment Diary       Manual 11/1 11/3 11/17     SOR SMF SMF SMF     TPR/STM B UT, LS, cervical paraspinals SMF SMF ea SMF ea     B UT stretch        B LS stretch  SMF SMF ea             STM temporalis and frontalis  SMF ea SMF ea                     Exercise Diary         UBE                Chin tucks 2 sec x 10 2 sec x 20              TB Scap Retraction No TB: 2 sec x 10       TB B S' Ext                TB B ER        TB Horiz ABD                Seated thoracic extension                Chicken wings                SNAGs Rotation  2 sec x 10 ea      SNAGs Extension                Guppies   2 sec; 2x10                                     HEP/EDU Diagnosis, prognosis, POC, proper responses to exercises, HEP Updated with HEP      Modalities        MH  5 mins with chin tucks 20 mins to mid back                        Skin checks performed pre and post application: intact

## 2023-11-22 ENCOUNTER — OFFICE VISIT (OUTPATIENT)
Dept: PHYSICAL THERAPY | Facility: CLINIC | Age: 63
End: 2023-11-22

## 2023-11-22 DIAGNOSIS — R29.3 POOR POSTURE: ICD-10-CM

## 2023-11-22 DIAGNOSIS — F45.8 BRUXISM: ICD-10-CM

## 2023-11-22 DIAGNOSIS — R42 DIZZINESS: ICD-10-CM

## 2023-11-22 DIAGNOSIS — R51.9 NONINTRACTABLE HEADACHE, UNSPECIFIED CHRONICITY PATTERN, UNSPECIFIED HEADACHE TYPE: ICD-10-CM

## 2023-11-22 DIAGNOSIS — M54.2 NECK PAIN: Primary | ICD-10-CM

## 2023-11-22 PROCEDURE — 97140 MANUAL THERAPY 1/> REGIONS: CPT | Performed by: PHYSICAL THERAPIST

## 2023-11-22 PROCEDURE — 97110 THERAPEUTIC EXERCISES: CPT | Performed by: PHYSICAL THERAPIST

## 2023-11-22 NOTE — PROGRESS NOTES
Daily Note     Today's date: 2023  Patient name: Fernanda Glaser  : 1960  MRN: [id number]  Referring provider: Yohana Vargas, PT  Dx:   Encounter Diagnosis     ICD-10-CM    1. Neck pain  M54.2       2. Dizziness  R42       3. Poor posture  R29.3       4. Nonintractable headache, unspecified chronicity pattern, unspecified headache type  R51.9       5. Bruxism  F45.8           Start Time: 1715  Stop Time: 1154  Total time in clinic (min): 35 minutes    Subjective: Thursday through mid-Monday were good days. Then it got bad Monday evening and yesterday. She did pilates w/ a lot of unsupported head postures and she wondered if that flared her. Her headaches come and go. Just always feels "weird" in her eyes. Objective: See treatment diary below      Assessment: Tolerated treatment well. Patient would benefit from continued PT. Improved TMJ mobility post JM. Pt did well w/ there ex. Edu on avoiding over-taxing cervical musculature w/ exericse. Plan: Continue per plan of care.       Precautions: standard:    HEP: chin tucks, scap retraction    Specialty Daily Treatment Diary       Manual 11/1 11/3 11/15 11/17 11/22    SOR SMF SMF  SMF JE    TPR/STM B UT, LS, cervical paraspinals SMF SMF ea  SMF ea     B UT stretch         B LS stretch  SMF  SMF ea              STM temporalis and frontalis  SMF ea  SMF ea     STM     L>R masseter, medial pterygoid    JM cervical     R OA SB MWM    R C3-4 lateral side glides G3-4    JM TMJ     Medial glide L TMJ G3-4    Lateral glide R TMJ G3-4    Exercise Diary          UBE                  Chin tucks 2 sec x 10 2 sec x 20   2 sec x 20    Scapular retraction/elevation     10x5 sec     TB Scap Retraction No TB: 2 sec x 10        TB B S' Ext                  TB B ER         TB Horiz ABD                  Seated thoracic extension                  Chicken wings                  SNAGs Rotation  2 sec x 10 ea       SNAGs Extension                  Guppies    2 sec; 2x10 2x10 (w/ mirror)    Self JM TMJ     External L medial/R lateral 2x10                               HEP/EDU Diagnosis, prognosis, POC, proper responses to exercises, HEP Updated with HEP  Education on condition and POC     Modalities           5 mins with chin tucks  20 mins to mid back                          Skin checks performed pre and post application: intact

## 2023-11-24 ENCOUNTER — APPOINTMENT (OUTPATIENT)
Dept: PHYSICAL THERAPY | Facility: CLINIC | Age: 63
End: 2023-11-24

## 2023-11-28 ENCOUNTER — OFFICE VISIT (OUTPATIENT)
Dept: PHYSICAL THERAPY | Facility: CLINIC | Age: 63
End: 2023-11-28

## 2023-11-28 ENCOUNTER — TELEPHONE (OUTPATIENT)
Dept: FAMILY MEDICINE CLINIC | Facility: CLINIC | Age: 63
End: 2023-11-28

## 2023-11-28 DIAGNOSIS — R42 DIZZINESS: ICD-10-CM

## 2023-11-28 DIAGNOSIS — R29.3 POOR POSTURE: ICD-10-CM

## 2023-11-28 DIAGNOSIS — R51.9 NONINTRACTABLE HEADACHE, UNSPECIFIED CHRONICITY PATTERN, UNSPECIFIED HEADACHE TYPE: ICD-10-CM

## 2023-11-28 DIAGNOSIS — M54.2 NECK PAIN: Primary | ICD-10-CM

## 2023-11-28 DIAGNOSIS — F45.8 BRUXISM: ICD-10-CM

## 2023-11-28 PROCEDURE — 97110 THERAPEUTIC EXERCISES: CPT | Performed by: PHYSICAL THERAPIST

## 2023-11-28 PROCEDURE — 97140 MANUAL THERAPY 1/> REGIONS: CPT | Performed by: PHYSICAL THERAPIST

## 2023-11-28 NOTE — PROGRESS NOTES
Daily Note     Today's date: 2023  Patient name: Mario Alberto Barker  : 1960  MRN: [id number]  Referring provider: Jesús Pires PT  Dx:   Encounter Diagnosis     ICD-10-CM    1. Neck pain  M54.2       2. Dizziness  R42       3. Poor posture  R29.3       4. Bruxism  F45.8       5. Nonintractable headache, unspecified chronicity pattern, unspecified headache type  R51.9                      Subjective: Thursday- were good days. Monday wasn't good - had a dizzy feeling. Dawood Graver yesterday and today. Went to the gym she was pretty good, but other moments t/o the day her eyes just had difficulty focusing well. Gets MRI tomorrow. Objective: See treatment diary below      Assessment: Tolerated treatment well. Patient would benefit from continued PT. Initiated tx w/ upper cervical JM for improved mobility. Poor neuromuscular control w/ lingual exercises, but improved w/ training. Tolerated jaw isometrics well. R OA SB restricted and improved post JM . Plan: Continue per plan of care.       Precautions: standard:    HEP: chin tucks, scap retraction    Specialty Daily Treatment Diary       Manual 11/1 11/3 11/15 11/17 11/22 11/28   SOR SMF SMF  SMF JE JE R>L   TPR/STM B UT, LS, cervical paraspinals SMF SMF ea  SMF ea     B UT stretch         B LS stretch  SMF  SMF ea              STM temporalis and frontalis  SMF ea  SMF ea     STM     L>R masseter, medial pterygoid L>R masseter, medial pterygoid   JM cervical     R OA SB MWM    R C3-4 lateral side glides G3-4 R OA SB MWM    R OA distraction G3-4    R C3-4 lateral side glides G3-4   JM TMJ     Medial glide L TMJ G3-4    Lateral glide R TMJ G3-4 Medial glide L TMJ G3-4    Lateral glide R TMJ G3-4             Exercise Diary                  Chin tucks 2 sec x 10 2 sec x 20   2 sec x 20 X10x2 sec   Scapular retraction/elevation     10x5 sec  review   TB Scap Retraction No TB: 2 sec x 10        TB B S' Ext                  TB B ER         TB Horiz ABD                  Seated thoracic extension                  Chicken wings                  SNAGs Rotation  2 sec x 10 ea       SNAGs Extension         Jaw isometric lateral excursion      2x5x5 sec   Guppies    2 sec; 2x10 2x10 (w/ mirror) 2x10 (w/ mirror)   v's      2x10 (w/ mirror)   circles         clocks         Self JM TMJ     External L medial/R lateral 2x10 External L medial/R lateral 2x10                              HEP/EDU Diagnosis, prognosis, POC, proper responses to exercises, HEP Updated with HEP  Education on condition and POC     Modalities           5 mins with chin tucks  20 mins to mid back                          Skin checks performed pre and post application: intact

## 2023-11-28 NOTE — TELEPHONE ENCOUNTER
Pt came in because she says that she would need another script for PT. Pt was told by PT to get another script for this Thursday.

## 2023-11-29 ENCOUNTER — HOSPITAL ENCOUNTER (OUTPATIENT)
Dept: MRI IMAGING | Facility: HOSPITAL | Age: 63
Discharge: HOME | End: 2023-11-29
Payer: COMMERCIAL

## 2023-11-29 DIAGNOSIS — F45.8 BRUXISM: ICD-10-CM

## 2023-11-29 DIAGNOSIS — G44.52 NEW DAILY PERSISTENT HEADACHE: ICD-10-CM

## 2023-11-29 DIAGNOSIS — R42 DIZZINESS: ICD-10-CM

## 2023-11-29 DIAGNOSIS — G44.89 OTHER HEADACHE SYNDROME: ICD-10-CM

## 2023-11-29 DIAGNOSIS — M54.2 CHRONIC NECK PAIN: Primary | ICD-10-CM

## 2023-11-29 DIAGNOSIS — G89.29 CHRONIC NECK PAIN: Primary | ICD-10-CM

## 2023-11-29 PROCEDURE — G1004 CDSM NDSC: HCPCS

## 2023-11-29 PROCEDURE — 70551 MRI BRAIN STEM W/O DYE: CPT

## 2023-11-30 ENCOUNTER — OFFICE VISIT (OUTPATIENT)
Dept: PHYSICAL THERAPY | Facility: CLINIC | Age: 63
End: 2023-11-30

## 2023-11-30 DIAGNOSIS — R51.9 NONINTRACTABLE HEADACHE, UNSPECIFIED CHRONICITY PATTERN, UNSPECIFIED HEADACHE TYPE: Primary | ICD-10-CM

## 2023-11-30 DIAGNOSIS — R42 DIZZINESS: ICD-10-CM

## 2023-11-30 DIAGNOSIS — R29.3 POOR POSTURE: ICD-10-CM

## 2023-11-30 DIAGNOSIS — M54.2 NECK PAIN: ICD-10-CM

## 2023-11-30 PROCEDURE — 97140 MANUAL THERAPY 1/> REGIONS: CPT | Performed by: PHYSICAL THERAPIST

## 2023-11-30 PROCEDURE — 97110 THERAPEUTIC EXERCISES: CPT | Performed by: PHYSICAL THERAPIST

## 2023-11-30 NOTE — PROGRESS NOTES
PT Evaluation     Today's date: 2023  Patient name: Lupe Cotton  : 1960  MRN: [id number]  Referring provider: Carmina Rodriguez PT  Dx:   Encounter Diagnosis     ICD-10-CM    1. Neck pain  M54.2       2. Dizziness  R42       3. Poor posture  R29.3                      Assessment  Assessment details: Lupe Cotton is a 61 y.o. female presenting to outpatient physical therapy at [facility] with complaints of onset of dizziness. Overall, she does report having some improvement with her sxs however she continues to have sxs especially facial pressure radiating in to her head. She had her MRI yesterday and thus pending results. She overall is improving with CROM; owever presents with decreased range of motion, decreased strength, limited flexibility, poor postural awareness, poor body mechanics, altered gait pattern, poor balance, decreased tolerance to activity and decreased functional mobility due to Neck pain  (primary encounter diagnosis), Dizziness, and Poor posture. Therapist discussed diagnosis, prognosis, POC, proper responses to exercises, postural awareness, HEP, and modalities to use at home. She would benefit from skilled PT services in order to address these deficits and reach maximum level of function. Thank you for the referral!   Impairments: abnormal muscle tone, abnormal or restricted ROM, abnormal movement, activity intolerance, impaired physical strength, lacks appropriate home exercise program, pain with function, scapular dyskinesis and poor posture     Symptom irritability: moderateUnderstanding of Dx/Px/POC: good   Prognosis: good    Goals  STGs (in 4 weeks):  1. Pt will report having at least a 50% improvement since I.E. -  Progressing towards  2. Pt will report having at most a 2/10 pain level with functional mobility. - Progressing towards  3. Pt will demonstrate improved CROM in all planes without onset of sxs. - Progressing towards     LTGs (in 12 weeks):  1.  Pt will report having at least a 75% improvement since I.E. - Progressing towards   2. Pt will be independent with HEP. - Progressing towards   3. Pt will report having overall more energy to perform household activities, chores, and ADLs. - Progressing towards     Plan  Patient would benefit from: PT eval and skilled physical therapy  Planned modality interventions: unattended electrical stimulation and TENS  Planned therapy interventions: manual therapy, joint mobilization, nerve gliding, patient education, neuromuscular re-education, strengthening, stretching, therapeutic activities, therapeutic exercise, home exercise program and flexibility  Frequency: 2x week  Plan of Care beginning date: 2023  Plan of Care expiration date: 2024  Treatment plan discussed with: patient      Subjective Evaluation    History of Present Illness    RE: 23: Patient presents with continued sxs with gradually improving sxs. She does report that she continues to have pressure in her face and head with insidious onset. Mechanism of injury: Pt is a 61year old female who presents with chief complaint of dizziness, headaches, and facial/head pressure that began over the summer when she was first diagnosed with bronchitis. She then had an upper respiratory infection and was placed on steroids in the middle of September. She reports that her cough symptoms from the infection improved however she has ongoing constant dizziness as well as headache. She was recently diagnosed with a R sided ear infection and was told she has fluid on her ear so she was prescribed antiobiotics. She recently stopped the antiobiotics at day 11 because side effects show dizziness.    Quality of life: good    Patient Goals  Patient goals for therapy: decreased pain, improved balance, increased motion, increased strength, independence with ADLs/IADLs and return to sport/leisure activities    Pain  Current pain ratin  At best pain ratin  At worst pain rating: 10  Quality: dizziness, headache, pressure. Alleviating factors: exercise. Exacerbated by: constant, decreased energy/gardening.   Progression: worsening      Objective    Observation/Posture: slight FHP and rounded shoulders    Cervical Screen  (*=pain)  (**=symptom provocation)    AROM: (deg)  Flexion: 40  Extension: 50  R Rot: 70  L Rot: 55  R Lat Flex: 20  L Lat Flex: 25    Palpation/Tenderness: TTPR B UT, LS and cervical paraspinals    Joint Mobility: decreased PA glides in cervical and thoracic spine    Strength: (MMT)    R  L  S' Flex  4/5  4/5  S' ABD  4/5  4/5  S' ER  4-/5  4-/5  S' IR  4/5  4/5    Visual-Occular and Vestibular Testing:  Smooth pursuits: negative   Convergence: negative  Saccades: negative  VORx1: 30 sec x 1 (horiz and vert)  [place]pike: (R) negative (L) negative      Special Tests  Vertebral Artery: (R) negative (L) negative         Precautions: standard    HEP: chin tucks, scap retraction    Specialty Daily Treatment Diary       Manual 11/30  11/15 11/17 11/22 11/28   SOR SMF   SMF JE JE R>L   TPR/STM B UT, LS, cervical paraspinals SMF ea   SMF ea     B UT stretch SMF ea        B LS stretch    SMF ea              STM temporalis and frontalis SMF ea   SMF ea     STM     L>R masseter, medial pterygoid L>R masseter, medial pterygoid   JM cervical     R OA SB MWM    R C3-4 lateral side glides G3-4 R OA SB MWM    R OA distraction G3-4    R C3-4 lateral side glides G3-4   JM TMJ     Medial glide L TMJ G3-4    Lateral glide R TMJ G3-4 Medial glide L TMJ G3-4    Lateral glide R TMJ G3-4             Exercise Diary                  Chin tucks     2 sec x 20 X10x2 sec   Scapular retraction/elevation     10x5 sec  review   TB Scap Retraction         TB B S' Ext                  TB B ER         TB Horiz ABD                  Seated thoracic extension                  Chicken wings                  SNAGs Rotation AROM: 5 sec x 5 ea        AROM Lat Flexion 5 sec x 5 ea        Segmental cervical flexion 5x        SNAGs Extension         Jaw isometric lateral excursion      2x5x5 sec   Guppies    2 sec; 2x10 2x10 (w/ mirror) 2x10 (w/ mirror)   v's      2x10 (w/ mirror)   circles         clocks         Self JM TMJ     External L medial/R lateral 2x10 External L medial/R lateral 2x10                              HEP/EDU Reviewed HEP   Education on condition and POC     Modalities             20 mins to mid back                          Skin checks performed pre and post application: intact

## 2023-12-07 ENCOUNTER — OFFICE VISIT (OUTPATIENT)
Dept: PHYSICAL THERAPY | Facility: CLINIC | Age: 63
End: 2023-12-07

## 2023-12-07 ENCOUNTER — TELEPHONE (OUTPATIENT)
Dept: FAMILY MEDICINE CLINIC | Facility: CLINIC | Age: 63
End: 2023-12-07

## 2023-12-07 DIAGNOSIS — F45.8 BRUXISM: ICD-10-CM

## 2023-12-07 DIAGNOSIS — R51.9 NONINTRACTABLE HEADACHE, UNSPECIFIED CHRONICITY PATTERN, UNSPECIFIED HEADACHE TYPE: Primary | ICD-10-CM

## 2023-12-07 DIAGNOSIS — R42 DIZZINESS: ICD-10-CM

## 2023-12-07 DIAGNOSIS — R29.3 POOR POSTURE: ICD-10-CM

## 2023-12-07 DIAGNOSIS — M54.2 NECK PAIN: ICD-10-CM

## 2023-12-07 PROCEDURE — 97110 THERAPEUTIC EXERCISES: CPT | Performed by: PHYSICAL THERAPIST

## 2023-12-07 PROCEDURE — 97140 MANUAL THERAPY 1/> REGIONS: CPT | Performed by: PHYSICAL THERAPIST

## 2023-12-07 NOTE — PROGRESS NOTES
Daily Note     Today's date: 2023  Patient name: Kerrie Trujillo  : 1960  MRN: [id number]  Referring provider: Lupe Gonzalez, *  Dx:   Encounter Diagnosis     ICD-10-CM    1. Nonintractable headache, unspecified chronicity pattern, unspecified headache type  R51.9       2. Neck pain  M54.2       3. Dizziness  R42       4. Bruxism  F45.8       5. Poor posture  R29.3                      Subjective: She had a massage Tuesday - had cupping - felt weird, but the next day felt great. Saw a vestibular therapist and he siad he didn't think it's vestibular, per pt. Objective: See treatment diary below      Assessment: Tolerated treatment well. Patient would benefit from continued PT. Initiated tx w/ STM for decreased mms restrictions. Pt tolerated chicken wings well and reported improved dizzy feeling after doing them      Plan: Continue per plan of care.       Precautions: standard    HEP: chin tucks, scap retraction    Specialty Daily Treatment Diary       Manual    SOR SMF JE  SMF JE JE R>L   TPR/STM B UT, LS, cervical paraspinals SMF ea JE  SMF ea     B UT stretch SMF ea JE       B LS stretch    SMF ea              STM temporalis and frontalis SMF ea   SMF ea     STM     L>R masseter, medial pterygoid L>R masseter, medial pterygoid   JM cervical     R OA SB MWM    R C3-4 lateral side glides G3-4 R OA SB MWM    R OA distraction G3-4    R C3-4 lateral side glides G3-4   JM TMJ     Medial glide L TMJ G3-4    Lateral glide R TMJ G3-4 Medial glide L TMJ G3-4    Lateral glide R TMJ G3-4             Exercise Diary                  Chin tucks  10x5 sec   2 sec x 20 X10x2 sec   Scapular retraction/elevation     10x5 sec  review   TB Scap Retraction         TB B S' Ext                  TB B ER         TB Horiz ABD                  Seated thoracic extension                  Chicken wings  3x10x5 sec HEP                SNAGs Rotation AROM: 5 sec x 5 ea        AROM Lat Flexion 5 sec x 5 ea        Segmental cervical flexion 5x        SNAGs Extension         Jaw isometric lateral excursion      2x5x5 sec   Guppies    2 sec; 2x10 2x10 (w/ mirror) 2x10 (w/ mirror)   v's      2x10 (w/ mirror)   circles         clocks         Self JM TMJ     External L medial/R lateral 2x10 External L medial/R lateral 2x10                              HEP/EDU Reviewed HEP   Education on condition and POC     Modalities             20 mins to mid back                          Skin checks performed pre and post application: intact

## 2023-12-07 NOTE — TELEPHONE ENCOUNTER
Patient called requesting a medical letter for cancellation of her plane flight due to ongoing migraines she is getting. She is afraid to fly due to the severity of the migraines. I advised patient to call us back with the following:    #1. Airline  #2:  Ticket Number  #3:  Date of Flight  #4:  Where she would like this sent to.

## 2023-12-21 ENCOUNTER — OFFICE VISIT (OUTPATIENT)
Dept: PHYSICAL THERAPY | Facility: CLINIC | Age: 63
End: 2023-12-21

## 2023-12-21 DIAGNOSIS — R29.3 POOR POSTURE: ICD-10-CM

## 2023-12-21 DIAGNOSIS — M54.2 NECK PAIN: ICD-10-CM

## 2023-12-21 DIAGNOSIS — R42 DIZZINESS: ICD-10-CM

## 2023-12-21 DIAGNOSIS — R51.9 NONINTRACTABLE HEADACHE, UNSPECIFIED CHRONICITY PATTERN, UNSPECIFIED HEADACHE TYPE: Primary | ICD-10-CM

## 2023-12-21 DIAGNOSIS — F45.8 BRUXISM: ICD-10-CM

## 2023-12-21 PROCEDURE — 97140 MANUAL THERAPY 1/> REGIONS: CPT | Performed by: PHYSICAL THERAPIST

## 2023-12-21 PROCEDURE — 97110 THERAPEUTIC EXERCISES: CPT | Performed by: PHYSICAL THERAPIST

## 2023-12-21 NOTE — PROGRESS NOTES
Daily Note     Today's date: 2023  Patient name: Ilene Hoffman  : 1960  MRN: [id number]  Referring provider: Lesli Anderson, *  Dx:   Encounter Diagnosis     ICD-10-CM    1. Nonintractable headache, unspecified chronicity pattern, unspecified headache type  R51.9       2. Neck pain  M54.2       3. Dizziness  R42       4. Poor posture  R29.3       5. Bruxism  F45.8                      Subjective: Had 11 good days! She's had light dizziness feeling since Tuesday.       Objective: See treatment diary below      Assessment: Tolerated treatment well. Patient would benefit from continued PT. Pt tolerated session well overall today. Continues to respond well to PT       Plan: Continue per plan of care.      Precautions: standard    HEP: chin tucks, scap retraction    Specialty Daily Treatment Diary       Manual    SOR SMF JE JE  JE JE R>L   TPR/STM B UT, LS, cervical paraspinals SMF ea JE JE      B UT stretch SMF ea JE       B LS stretch                  STM temporalis and frontalis SMF ea        STM     L>R masseter, medial pterygoid L>R masseter, medial pterygoid   JM cervical   R C3-4 lateral side glides G3-4  R OA SB MWM    R C3-4 lateral side glides G3-4 R OA SB MWM    R OA distraction G3-4    R C3-4 lateral side glides G3-4   JM TMJ     Medial glide L TMJ G3-4    Lateral glide R TMJ G3-4 Medial glide L TMJ G3-4    Lateral glide R TMJ G3-4             Exercise Diary                  Chin tucks  10x5 sec 2x10x5 sec  2 sec x 20 X10x2 sec   Scapular retraction/elevation   review  10x5 sec  review   TB Scap Retraction                                    Chicken wings  3x10x5 sec HEP 2x10x5 sec HEP               SNAGs Rotation AROM: 5 sec x 5 ea        AROM Lat Flexion 5 sec x 5 ea        Segmental cervical flexion 5x        SNAGs Extension         Jaw isometric lateral excursion      2x5x5 sec   Guppies     2x10 (w/ mirror) 2x10 (w/ mirror)   v's      2x10 (w/ mirror)    circles         clocks         Self JM TMJ     External L medial/R lateral 2x10 External L medial/R lateral 2x10                              HEP/EDU Reviewed HEP        Modalities                                       Skin checks performed pre and post application: intact

## 2024-01-04 ENCOUNTER — OFFICE VISIT (OUTPATIENT)
Dept: PHYSICAL THERAPY | Facility: CLINIC | Age: 64
End: 2024-01-04
Payer: COMMERCIAL

## 2024-01-04 DIAGNOSIS — R42 DIZZINESS: ICD-10-CM

## 2024-01-04 DIAGNOSIS — M54.2 NECK PAIN: ICD-10-CM

## 2024-01-04 DIAGNOSIS — R51.9 NONINTRACTABLE HEADACHE, UNSPECIFIED CHRONICITY PATTERN, UNSPECIFIED HEADACHE TYPE: Primary | ICD-10-CM

## 2024-01-04 DIAGNOSIS — F45.8 BRUXISM: ICD-10-CM

## 2024-01-04 DIAGNOSIS — R29.3 POOR POSTURE: ICD-10-CM

## 2024-01-04 PROCEDURE — 97140 MANUAL THERAPY 1/> REGIONS: CPT | Performed by: PHYSICAL THERAPIST

## 2024-01-04 PROCEDURE — 97110 THERAPEUTIC EXERCISES: CPT | Performed by: PHYSICAL THERAPIST

## 2024-01-04 NOTE — PROGRESS NOTES
Daily Note     Today's date: 2024  Patient name: Ilene Hoffman  : 1960  MRN: [id number]  Referring provider: Lesli Anderson, *  Dx:   Encounter Diagnosis     ICD-10-CM    1. Nonintractable headache, unspecified chronicity pattern, unspecified headache type  R51.9       2. Neck pain  M54.2       3. Dizziness  R42       4. Bruxism  F45.8       5. Poor posture  R29.3                      Subjective: Since last session she has had a lot of dizziness intermittently, and then the last 3 days it's been pretty constant. Hasn't been working out since before [facility] until this week. She's just sore in her head - not a pounding headache. Jaw has been clicking again today w/ mouth opening. Has been very sensitive to certain sounds.       Objective: See treatment diary below      Assessment: Tolerated treatment well. Patient would benefit from continued PT. Initiated tx w/ SOR for decreased restrictions. Pt reported relieved dizziness post tx. Did well w/ all there ex.       Plan: Continue per plan of care.      Precautions: standard    HEP: chin tucks, scap retraction    Specialty Daily Treatment Diary       Manual    SOR SMF JE JE JE  JE R>L   TPR/STM B UT, LS, cervical paraspinals SMF ea JE JE JE     B UT stretch SMF ea JE  JE     B LS stretch                  STM temporalis and frontalis SMF ea        STM      L>R masseter, medial pterygoid   JM cervical   R C3-4 lateral side glides G3-4   R OA SB MWM    R OA distraction G3-4    R C3-4 lateral side glides G3-4   JM TMJ      Medial glide L TMJ G3-4    Lateral glide R TMJ G3-4             Exercise Diary                  Chin tucks  10x5 sec 2x10x5 sec 2x10x5 sec  X10x2 sec   Scapular retraction/elevation   review   review   TB Scap Retraction                                    Chicken wings  3x10x5 sec HEP 2x10x5 sec HEP 2x10x5 sec HEP              SNAGs Rotation AROM: 5 sec x 5 ea   AROM: 5 sec x 5 ea     AROM Lat Flexion 5  sec x 5 ea        Segmental cervical flexion 5x   review     SNAGs Extension         Jaw isometric lateral excursion      2x5x5 sec   Guppies      2x10 (w/ mirror)   v's      2x10 (w/ mirror)   circles         clocks         Self JM TMJ      External L medial/R lateral 2x10                              HEP/EDU Reviewed HEP        Modalities         MH                              Skin checks performed pre and post application: intact

## 2024-01-09 ENCOUNTER — OFFICE VISIT (OUTPATIENT)
Dept: PHYSICAL THERAPY | Facility: CLINIC | Age: 64
End: 2024-01-09
Payer: COMMERCIAL

## 2024-01-09 DIAGNOSIS — F45.8 BRUXISM: ICD-10-CM

## 2024-01-09 DIAGNOSIS — R42 DIZZINESS: ICD-10-CM

## 2024-01-09 DIAGNOSIS — M54.2 NECK PAIN: ICD-10-CM

## 2024-01-09 DIAGNOSIS — R29.3 POOR POSTURE: ICD-10-CM

## 2024-01-09 DIAGNOSIS — R51.9 NONINTRACTABLE HEADACHE, UNSPECIFIED CHRONICITY PATTERN, UNSPECIFIED HEADACHE TYPE: Primary | ICD-10-CM

## 2024-01-09 PROCEDURE — 97110 THERAPEUTIC EXERCISES: CPT

## 2024-01-09 PROCEDURE — 97140 MANUAL THERAPY 1/> REGIONS: CPT

## 2024-01-09 NOTE — PROGRESS NOTES
Daily Note     Today's date: 2024  Patient name: Ilene Hoffman  : 1960  MRN: [id number]  Referring provider: Lesli Anderson, *  Dx:   Encounter Diagnosis     ICD-10-CM    1. Nonintractable headache, unspecified chronicity pattern, unspecified headache type  R51.9       2. Poor posture  R29.3       3. Dizziness  R42       4. Bruxism  F45.8       5. Neck pain  M54.2           Start Time: 09  Stop Time: 1005  Total time in clinic (min): 35 minutes    Subjective: Pt reports symptoms  yesterday were significant and overall increase dizziness all day. She reported no relief with self stretching, self massage or modalities. She was given recommendation from CRNP to obtain ecedrin migraine for HA relief. She reports in general sx are better today.       Objective: See treatment diary below      Assessment: Tolerated treatment well. She has good tolerance to manuals, STM and particularly SOR. She reports overall relief of tightness a tthe end of her sx. Added SNAG ext this session.  Patient exhibited good technique with therapeutic exercises and would benefit from continued PT      Plan: Continue per plan of care.      Precautions: standard    HEP: chin tucks, scap retraction    Specialty Daily Treatment Diary       Manual    SOR SMF [facility] JE R>L   TPR/STM B UT, LS, cervical paraspinals SMF khari [facility]    B UT stretch SMF ea [facility]    B LS stretch                  STM temporalis and frontalis SMF         STM      L>R masseter, medial pterygoid   JM cervical   R C3-4 lateral side glides G3-4   R OA SB MWM    R OA distraction G3-4    R C3-4 lateral side glides G3-4   JM TMJ      Medial glide L TMJ G3-4    Lateral glide R TMJ G3-4             Exercise Diary                  Chin tucks  10x5 sec 2x10x5 sec 2x10x5 sec 2x10x5 sec  X10x2 sec   Scapular retraction/elevation   review   review   TB Scap Retraction                                    Chicken wings   3x10x5 sec HEP 2x10x5 sec HEP 2x10x5 sec HEP 2x10x5 sec             SNAGs Rotation AROM: 5 sec x 5 ea   AROM: 5 sec x 5 ea AROM: 5 sec x 5 ea, Ext 5sec x5    AROM Lat Flexion 5 sec x 5 ea        Segmental cervical flexion 5x   review     SNAGs Extension         Jaw isometric lateral excursion      2x5x5 sec   Guppies      2x10 (w/ mirror)   v's      2x10 (w/ mirror)   circles         clocks         Self JM TMJ      External L medial/R lateral 2x10                              HEP/EDU Reviewed HEP        Modalities                                       Skin checks performed pre and post application: intact

## 2024-01-11 ENCOUNTER — OFFICE VISIT (OUTPATIENT)
Dept: PHYSICAL THERAPY | Facility: CLINIC | Age: 64
End: 2024-01-11
Payer: COMMERCIAL

## 2024-01-11 ENCOUNTER — APPOINTMENT (OUTPATIENT)
Dept: PHYSICAL THERAPY | Facility: CLINIC | Age: 64
End: 2024-01-11
Payer: COMMERCIAL

## 2024-01-11 DIAGNOSIS — R29.3 POOR POSTURE: ICD-10-CM

## 2024-01-11 DIAGNOSIS — M54.2 NECK PAIN: ICD-10-CM

## 2024-01-11 DIAGNOSIS — R51.9 NONINTRACTABLE HEADACHE, UNSPECIFIED CHRONICITY PATTERN, UNSPECIFIED HEADACHE TYPE: Primary | ICD-10-CM

## 2024-01-11 DIAGNOSIS — F45.8 BRUXISM: ICD-10-CM

## 2024-01-11 DIAGNOSIS — R42 DIZZINESS: ICD-10-CM

## 2024-01-11 PROCEDURE — 97110 THERAPEUTIC EXERCISES: CPT | Performed by: PHYSICAL THERAPIST

## 2024-01-11 PROCEDURE — 97140 MANUAL THERAPY 1/> REGIONS: CPT | Performed by: PHYSICAL THERAPIST

## 2024-01-11 NOTE — PROGRESS NOTES
Daily Note     Today's date: 2024  Patient name: Ilene Hoffman  : 1960  MRN: [id number]  Referring provider: Lesli Anderson, *  Dx:   Encounter Diagnosis     ICD-10-CM    1. Nonintractable headache, unspecified chronicity pattern, unspecified headache type  R51.9       2. Poor posture  R29.3       3. Neck pain  M54.2       4. Dizziness  R42       5. Bruxism  F45.8                      Subjective: Had a good day Monday but that night was really bad. She shoveled a little bit Monday. After last session on Tuesday she had some relief, but was having some shooting zings Tuesday and Wednesday t/o her body randomly. Tylenol and Aleve had helped.       Objective: See treatment diary below      Assessment: Tolerated treatment well. Patient would benefit from continued PT. Initiated tx w/ discussion of recent sx and POC. Advised pt to consult w/ physician if symptoms worsen, but to continue HEP at this time as sx could well be in response to snow shoveling. Pt responded well to session today w/o increased sx. Negative alar and transverse ligament tests      Plan: Continue per plan of care.      Precautions: standard    HEP: chin tucks, scap retraction    Specialty Daily Treatment Diary     Manual    SOR SMF [facility] JE   TPR/STM B UT, LS, cervical paraspinals SMF khari [facility] JE   B UT stretch SMF khari [facility]   B LS stretch                  STM temporalis and frontalis SMF ea        RUST         JM cervical   R C3-4 lateral side glides G3-4      JM TMJ                   Exercise Diary         Pt education      Discussed condition and POC at length   Chin tucks  10x5 sec 2x10x5 sec 2x10x5 sec 2x10x5 sec  2x10x5 sec    Scapular retraction/elevation   review      TB Scap Retraction                                    Chicken wings  3x10x5 sec HEP 2x10x5 sec HEP 2x10x5 sec HEP 2x10x5 sec 2x10x5 sec            SNAGs Rotation AROM: 5 sec x 5 ea   AROM: 5 sec x 5 ea AROM: 5  sec x 5 ea, Ext 5sec x5 review   AROM Lat Flexion 5 sec x 5 ea        Segmental cervical flexion 5x   review     SNAGs Extension         Jaw isometric lateral excursion         Guppisai         v's         circles         clocks         Self JM TMJ                                    HEP/EDU Reviewed HEP        Modalities                                       Skin checks performed pre and post application: intact

## 2024-01-16 ENCOUNTER — OFFICE VISIT (OUTPATIENT)
Dept: FAMILY MEDICINE CLINIC | Facility: CLINIC | Age: 64
End: 2024-01-16

## 2024-01-16 VITALS
TEMPERATURE: 97.3 F | WEIGHT: 144.8 LBS | BODY MASS INDEX: 28.43 KG/M2 | OXYGEN SATURATION: 95 % | DIASTOLIC BLOOD PRESSURE: 80 MMHG | HEART RATE: 90 BPM | SYSTOLIC BLOOD PRESSURE: 122 MMHG | HEIGHT: 60 IN

## 2024-01-16 DIAGNOSIS — M25.50 CHRONIC PAIN OF MULTIPLE JOINTS: ICD-10-CM

## 2024-01-16 DIAGNOSIS — R51.9 CHRONIC NONINTRACTABLE HEADACHE, UNSPECIFIED HEADACHE TYPE: Primary | ICD-10-CM

## 2024-01-16 DIAGNOSIS — G89.29 CHRONIC PAIN OF MULTIPLE JOINTS: ICD-10-CM

## 2024-01-16 DIAGNOSIS — G89.29 CHRONIC NONINTRACTABLE HEADACHE, UNSPECIFIED HEADACHE TYPE: Primary | ICD-10-CM

## 2024-01-16 PROBLEM — R10.2 PELVIC PAIN: Status: RESOLVED | Noted: 2023-02-14 | Resolved: 2024-01-16

## 2024-01-16 PROBLEM — G56.03 CARPAL TUNNEL SYNDROME ON BOTH SIDES: Status: RESOLVED | Noted: 2020-01-18 | Resolved: 2024-01-16

## 2024-01-16 PROCEDURE — 99214 OFFICE O/P EST MOD 30 MIN: CPT | Performed by: FAMILY MEDICINE
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
## 2024-01-16 NOTE — PATIENT INSTRUCTIONS
Schedule mri cervical spine.   Schedule with neurology for headaches.     Neck Exercises   AMBULATORY CARE:   Neck exercises  help reduce neck pain, and improve neck movement and strength. Neck exercises also help prevent long-term neck problems.  Call your doctor if:   Your pain does not get better, or gets worse.    You have questions or concerns about your condition, care, or exercise program.    What you need to know about exercise safety:   Move slowly, gently, and smoothly.  Avoid fast or jerky motions.    Stand and sit the way your healthcare provider shows you.  Good posture may reduce your neck pain. Check your posture often, even when you are not doing your neck exercises.    Follow the exercise program recommended by your healthcare provider.  He or she will tell you which exercises are best for your condition. He or she will also tell you how many repetitions to do and how often you should do the exercises.    How to perform neck exercises safely:   Exercise position:  You may sit or stand while you do neck exercises. Face forward. Your shoulders should be straight and relaxed, with a good posture.         Head tilts, forward and back:  Gently bow your head and try to touch your chin to your chest. Your healthcare provider may tell you to push on the back of your neck to help bow your head. Raise your chin back to the starting position. Tilt your head back as far as possible so you are looking up at the ceiling. Your healthcare provider may tell you to lift your chin to help tilt your head back. Return your head to the starting position.         Head tilts, side to side:  Tilt your head, bringing your ear toward your shoulder. Then tilt your head toward the other shoulder.         Head turns:  Turn your head to look over your shoulder. Tilt your chin down and try to touch it to your shoulder. Do not raise your shoulder to your chin. Face forward again. Do the same on the other side.         Head rolls:   Slowly bring your chin toward your chest. Next, roll your head to the right. Your ear should be positioned over your shoulder. Hold this position for 5 seconds. Roll your head back toward your chest and to the left into the same position. Hold for 5 seconds. Gently roll your head back and around in a clockwise [place] 3 times. Next, move your head in the reverse direction (counterclockwise) in a [place] 3 times. Do not shrug your shoulders upwards while you do this exercise.       Follow up with your doctor as directed:  Write down your questions so you remember to ask them during your visits.  © Copyright [employer] 2023 Information is for End User's use only and may not be sold, redistributed or otherwise used for commercial purposes.  The above information is an  only. It is not intended as medical advice for individual conditions or treatments. Talk to your doctor, nurse or pharmacist before following any medical regimen to see if it is safe and effective for you.

## 2024-01-18 ENCOUNTER — APPOINTMENT (OUTPATIENT)
Dept: PHYSICAL THERAPY | Facility: CLINIC | Age: 64
End: 2024-01-18
Payer: COMMERCIAL

## 2024-01-21 ENCOUNTER — HOSPITAL ENCOUNTER (OUTPATIENT)
Dept: MRI IMAGING | Facility: HOSPITAL | Age: 64
Discharge: HOME | End: 2024-01-21
Payer: COMMERCIAL

## 2024-01-21 DIAGNOSIS — G44.52 NEW DAILY PERSISTENT HEADACHE: ICD-10-CM

## 2024-01-21 DIAGNOSIS — G89.29 CHRONIC NECK PAIN: ICD-10-CM

## 2024-01-21 DIAGNOSIS — R42 DIZZINESS: ICD-10-CM

## 2024-01-21 DIAGNOSIS — M54.2 CHRONIC NECK PAIN: ICD-10-CM

## 2024-01-21 PROBLEM — M25.50 CHRONIC PAIN OF MULTIPLE JOINTS: Status: ACTIVE | Noted: 2024-01-21

## 2024-01-21 PROBLEM — J40 TRACHEOBRONCHITIS: Status: RESOLVED | Noted: 2023-03-20 | Resolved: 2024-01-21

## 2024-01-21 PROCEDURE — G1004 CDSM NDSC: HCPCS

## 2024-01-21 PROCEDURE — 72141 MRI NECK SPINE W/O DYE: CPT

## 2024-01-22 NOTE — ASSESSMENT & PLAN NOTE
Mri cervical spine without contrast  Xray of cervical spine shows osteophytes and foraminal stenosis  R/o cervical spinal stenosis,  Neurology evaluation

## 2024-01-25 ENCOUNTER — APPOINTMENT (OUTPATIENT)
Dept: PHYSICAL THERAPY | Facility: CLINIC | Age: 64
End: 2024-01-25
Payer: COMMERCIAL

## 2024-01-25 ENCOUNTER — TELEPHONE (OUTPATIENT)
Dept: FAMILY MEDICINE CLINIC | Facility: CLINIC | Age: 64
End: 2024-01-25

## 2024-01-25 NOTE — TELEPHONE ENCOUNTER
Patient saw her results of her MRI in My Chart and is asking what the next step is?    Please advise at [phone]

## 2024-01-26 ENCOUNTER — TELEPHONE (OUTPATIENT)
Dept: FAMILY MEDICINE CLINIC | Facility: CLINIC | Age: 64
End: 2024-01-26

## 2024-01-26 DIAGNOSIS — M48.02 NEURAL FORAMINAL STENOSIS OF CERVICAL SPINE: Primary | ICD-10-CM

## 2024-01-26 NOTE — TELEPHONE ENCOUNTER
----- Message from Terri Redding DO sent at 1/26/2024 12:24 AM EST -----  Your mri shows disc bulging at multiple levels.   There is severe narrowing around the nerve at the last level in the neck C6-C7  I would recommend an evaluation with spine and pain

## 2024-01-26 NOTE — TELEPHONE ENCOUNTER
Spoke with patient and results where provided.    Patient had a couple question.    Patient asking if she should continue with PT ? and if there  will be any change on what there working on currently.    if ok for patient see chiropractor, she is been seen one already but feel now the can work more on the issues.    On  opinion patient had an xray in 2021, if with the MRI are there any significant progression/change?  has issue progressive  worsening?    Patient has upcoming appointment with Spine and pain in 02/2024. Patient aware spine and pain will be best to answer those questions but asking for  opinion.

## 2024-01-30 ENCOUNTER — APPOINTMENT (OUTPATIENT)
Dept: PHYSICAL THERAPY | Facility: CLINIC | Age: 64
End: 2024-01-30
Payer: COMMERCIAL

## 2024-02-04 ENCOUNTER — APPOINTMENT (OUTPATIENT)
Dept: MRI IMAGING | Facility: HOSPITAL | Age: 64
End: 2024-02-04
Payer: COMMERCIAL

## 2024-02-19 ENCOUNTER — CONSULT (OUTPATIENT)
Dept: PAIN MEDICINE | Facility: MEDICAL CENTER | Age: 64
End: 2024-02-19
Payer: COMMERCIAL

## 2024-02-19 VITALS
HEIGHT: 60 IN | BODY MASS INDEX: 27.68 KG/M2 | WEIGHT: 141 LBS | OXYGEN SATURATION: 97 % | DIASTOLIC BLOOD PRESSURE: 80 MMHG | SYSTOLIC BLOOD PRESSURE: 135 MMHG | HEART RATE: 85 BPM

## 2024-02-19 DIAGNOSIS — M54.81 BILATERAL OCCIPITAL NEURALGIA: Primary | ICD-10-CM

## 2024-02-19 DIAGNOSIS — R51.9 OCCIPITAL HEADACHE: ICD-10-CM

## 2024-02-19 PROCEDURE — 99204 OFFICE O/P NEW MOD 45 MIN: CPT | Performed by: PHYSICAL MEDICINE & REHABILITATION
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
## 2024-02-27 ENCOUNTER — ANNUAL EXAM (OUTPATIENT)
Dept: GYNECOLOGY | Facility: CLINIC | Age: 64
End: 2024-02-27
Payer: COMMERCIAL

## 2024-02-27 VITALS
DIASTOLIC BLOOD PRESSURE: 72 MMHG | HEIGHT: 60 IN | BODY MASS INDEX: 27.37 KG/M2 | WEIGHT: 139.4 LBS | SYSTOLIC BLOOD PRESSURE: 118 MMHG

## 2024-02-27 DIAGNOSIS — Z12.11 COLON CANCER SCREENING: ICD-10-CM

## 2024-02-27 DIAGNOSIS — Z01.419 ENCOUNTER FOR ANNUAL ROUTINE GYNECOLOGICAL EXAMINATION: Primary | ICD-10-CM

## 2024-02-27 DIAGNOSIS — Z12.31 ENCOUNTER FOR SCREENING MAMMOGRAM FOR BREAST CANCER: ICD-10-CM

## 2024-02-27 PROCEDURE — 99396 PREV VISIT EST AGE 40-64: CPT | Performed by: OBSTETRICS & GYNECOLOGY

## 2024-02-27 NOTE — PROGRESS NOTES
Assessment/Plan:    pap is up to date    mammogram reviewed with her including breast density.  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  We discussed multiple causes of loss of height.  I did order a DEXA last year due to this.  She will check on coverage and schedule.    discussed preventive care, regular exercise and a healthy diet      No problem-specific Assessment & Plan notes found for this encounter.       Diagnoses and all orders for this visit:    Encounter for annual routine gynecological examination    Encounter for screening mammogram for breast cancer    Colon cancer screening  -     Ambulatory Referral to Gastroenterology; Future          Subjective:      Patient ID: Ilene Hoffman is a 63 y.o. female.    Patient here for yearly.  She has no GYN complaints.  At her last visit, we discussed the DEXA because she had lost height.  She had evaluation of neck pain and was found to have compression of her discs.    3D mammogram in April showed fatty tissue and average risk, she was recalled for the left breast.  This finding was not seen on the ultrasound only on mammogram and they recommended a repeat mammogram in 6 months  B/l dx mammogram is scheduled for April    Normal pap, negative HPV in 2-2022        The following portions of the patient's history were reviewed and updated as appropriate: allergies, current medications, past family history, past medical history, past social history, past surgical history, and problem list.    Review of Systems   Constitutional: Negative.    Gastrointestinal: Negative.    Genitourinary: Negative.          Objective:      /72 (BP Location: Left arm, Patient Position: Sitting)   Ht 5' (1.524 m)   Wt 63.2 kg (139 lb 6.4 oz)   LMP  (LMP Unknown)   BMI 27.22 kg/m²          Physical Exam  Vitals reviewed.   Constitutional:        Appearance: Normal appearance. She is well-developed.   Neck:      Thyroid: No thyromegaly.      Trachea: No tracheal deviation.   Cardiovascular:      Rate and Rhythm: Normal rate and regular rhythm.   Pulmonary:      Effort: Pulmonary effort is normal.      Breath sounds: Normal breath sounds.   Chest:   Breasts:     Breasts are symmetrical.      Right: No inverted nipple, mass, nipple discharge, skin change or tenderness.      Left: No inverted nipple, mass, nipple discharge, skin change or tenderness.   Abdominal:      General: There is no distension.      Palpations: Abdomen is soft. There is no mass.      Tenderness: There is no abdominal tenderness.   Genitourinary:     Labia:         Right: No rash, tenderness, lesion or injury.         Left: No rash, tenderness, lesion or injury.       Vagina: Normal.      Cervix: No cervical motion tenderness, discharge or friability.      Adnexa:         Right: No mass, tenderness or fullness.          Left: No mass, tenderness or fullness.        Rectum: Normal.   Neurological:      Mental Status: She is alert.

## 2024-03-05 ENCOUNTER — TELEPHONE (OUTPATIENT)
Age: 64
End: 2024-03-05

## 2024-04-24 ENCOUNTER — HOSPITAL ENCOUNTER (OUTPATIENT)
Dept: MAMMOGRAPHY | Facility: CLINIC | Age: 64
Discharge: HOME | End: 2024-04-24
Payer: COMMERCIAL

## 2024-04-24 VITALS — BODY MASS INDEX: 27.29 KG/M2 | HEIGHT: 60 IN | WEIGHT: 139 LBS

## 2024-04-24 DIAGNOSIS — R92.8 FOLLOW-UP EXAMINATION OF ABNORMAL MAMMOGRAM: ICD-10-CM

## 2024-04-24 DIAGNOSIS — R92.8 ABNORMAL MAMMOGRAM: Primary | ICD-10-CM

## 2024-04-24 PROCEDURE — G0279 TOMOSYNTHESIS, MAMMO: HCPCS

## 2024-04-24 PROCEDURE — 77066 DX MAMMO INCL CAD BI: CPT

## 2024-04-29 ENCOUNTER — TELEPHONE (OUTPATIENT)
Age: 64
End: 2024-04-29

## 2024-04-29 NOTE — TELEPHONE ENCOUNTER
Please review and advise of appropriate. Pt has not had a PHY since 10/2020. Care gap ok per GYN exam. Jude PHY appointment was 2020.

## 2024-04-30 NOTE — TELEPHONE ENCOUNTER
Scheduled PHY with you for 5/9/24 as that was the first available date that fit her schedule.  next available wasn't until the 17th.

## 2024-10-03 ENCOUNTER — OFFICE VISIT (OUTPATIENT)
Dept: FAMILY MEDICINE CLINIC | Facility: HOSPITAL | Age: 64
End: 2024-10-03
Payer: COMMERCIAL

## 2024-10-03 VITALS
DIASTOLIC BLOOD PRESSURE: 76 MMHG | SYSTOLIC BLOOD PRESSURE: 114 MMHG | WEIGHT: 130 LBS | BODY MASS INDEX: 25.39 KG/M2 | OXYGEN SATURATION: 98 % | HEART RATE: 78 BPM

## 2024-10-03 DIAGNOSIS — Z12.11 SCREEN FOR COLON CANCER: ICD-10-CM

## 2024-10-03 DIAGNOSIS — R53.83 OTHER FATIGUE: ICD-10-CM

## 2024-10-03 DIAGNOSIS — G89.29 CHRONIC INTRACTABLE HEADACHE, UNSPECIFIED HEADACHE TYPE: Primary | ICD-10-CM

## 2024-10-03 DIAGNOSIS — E78.1 HYPERTRIGLYCERIDEMIA: ICD-10-CM

## 2024-10-03 DIAGNOSIS — R51.9 CHRONIC INTRACTABLE HEADACHE, UNSPECIFIED HEADACHE TYPE: Primary | ICD-10-CM

## 2024-10-03 DIAGNOSIS — R35.0 URINARY FREQUENCY: ICD-10-CM

## 2024-10-03 PROCEDURE — 99215 OFFICE O/P EST HI 40 MIN: CPT | Performed by: INTERNAL MEDICINE

## 2024-10-03 NOTE — PROGRESS NOTES
Assessment/Plan:     Diagnosis ICD-10-CM Associated Orders   1. Chronic intractable headache, unspecified headache type  R51.9 CBC and differential    G89.29 Comprehensive metabolic panel      2. Hypertriglyceridemia  E78.1 CBC and differential     Comprehensive metabolic panel     Lipid Panel with Direct LDL reflex      3. Screen for colon cancer  Z12.11 Cologuard      4. Urinary frequency  R35.0 UA/M w/rflx Culture, Routine          Problem List Items Addressed This Visit          Surgery/Wound/Pain    Chronic headaches - Primary    Relevant Orders    CBC and differential    Comprehensive metabolic panel     Other Visit Diagnoses       Hypertriglyceridemia        Relevant Orders    CBC and differential    Comprehensive metabolic panel    Lipid Panel with Direct LDL reflex    Screen for colon cancer        Relevant Orders    Cologuard    Urinary frequency        Relevant Orders    UA/M w/rflx Culture, Routine              No follow-ups on file.      Subjective:    Patient ID: Ilene Hoffman is a 64 y.o. female    Here as a new patient- had been seen by Dr. Redding  1. Headache issues-started in September 2023-was doing gardening with heavy lifting etc- had 2 weeks of symptoms  and then seen  with dx of ear and sinus issues- - no difference- then seen by ent- no help with steroids or allergy meds or antibiotics.   See by ENT- no issues- Dr. Montoya- hyper sensitivity with hearing   In feb seen by  Dr. Paul Melissa - pain  medicine - she declined injections-  cervical djd  was noted on  mri of cervical spine   Seen by neurology- Dr. Wolfe with [facility]--no neurologic issues   Doing PT and  chiropractor 2x week and doing bym 2x week - some relief in July   Now set to see Dr. Aida Malone- now has noted a flare of symptoms starting in  Septemeber   Feels foggy and tired now and eyes get red- feels like she is in a dze    2. 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  3. Urinary frequency          The following portions of the patient's history were reviewed and updated as appropriate: allergies, current medications and problem list.     Review of Systems   HENT:  Negative for congestion.    Respiratory:  Negative for shortness of breath.    Cardiovascular:  Negative for chest pain and palpitations.   Gastrointestinal:  Negative for abdominal distention, constipation and diarrhea.   Genitourinary:         Daily urinary frequency   Musculoskeletal:  Negative for joint swelling.   Neurological:  Negative for dizziness and numbness.   Psychiatric/Behavioral:  The patient is nervous/anxious.         Aggravated with news on radio   All other systems reviewed and are negative.        Objective:      Current Outpatient Medications:     albuterol (Proventil HFA) 90 mcg/act inhaler, Inhale 2 puffs every 6 (six) hours as needed for wheezing, Disp: 6.7 g, Rfl: 0    ALPHA LIPOIC ACID PO, Take 300 mg by mouth, Disp: , Rfl:     CALCIUM PO, Take by mouth, Disp: , Rfl:     fish oil-omega-3 fatty acids 1000 MG capsule, Take by mouth if needed, Disp: , Rfl:     fluticasone (FLONASE) 50 mcg/act nasal spray, 1 spray into each nostril as needed, Disp: , Rfl:     Ginger, Zingiber officinalis, 550 MG CAPS, Take by mouth Daily at 2am, Disp: , Rfl:     MAGNESIUM PO, Take by mouth, Disp: , Rfl:     Multiple Vitamins-Minerals (IMMUNE SUPPORT PO), Take by mouth, Disp: , Rfl:     Probiotic Product (PROBIOTIC DAILY PO), Take by mouth, Disp: , Rfl:     SUPEROXIDE DISMUTASE PO, Take 2,000 Units by mouth, Disp: , Rfl:     TURMERIC PO, Take 1,000 mg by mouth in the morning, Disp: , Rfl:     Blood pressure 114/76, pulse 78, weight 59 kg (130 lb), SpO2 98%, not currently breastfeeding.     Physical Exam  Vitals and nursing note reviewed.   Constitutional:       General: She is not in acute distress.  HENT:      Head: Normocephalic.      Right  Ear: There is no impacted cerumen.      Left Ear: There is no impacted cerumen.      Nose: No congestion.      Mouth/Throat:      Pharynx: No posterior oropharyngeal erythema.   Eyes:      General:         Right eye: No discharge.         Left eye: No discharge.      Conjunctiva/sclera: Conjunctivae normal.      Pupils: Pupils are equal, round, and reactive to light.   Cardiovascular:      Rate and Rhythm: Normal rate and regular rhythm.      Heart sounds: No murmur heard.  Pulmonary:      Breath sounds: No wheezing or rhonchi.   Abdominal:      Palpations: Abdomen is soft.      Tenderness: There is no abdominal tenderness.   Musculoskeletal:         General: No tenderness.      Cervical back: No rigidity or tenderness.      Right lower leg: No edema.      Left lower leg: No edema.   Skin:     Findings: No erythema or rash.   Neurological:      Mental Status: She is alert.   Psychiatric:         Mood and Affect: Mood normal.         Thought Content: Thought content normal.         Judgment: Judgment normal.      Comments: Mild anxiety

## 2024-10-08 ENCOUNTER — TELEPHONE (OUTPATIENT)
Age: 64
End: 2024-10-08

## 2024-10-18 ENCOUNTER — HOSPITAL ENCOUNTER (EMERGENCY)
Facility: HOSPITAL | Age: 64
Discharge: HOME | End: 2024-10-18
Attending: EMERGENCY MEDICINE
Payer: COMMERCIAL

## 2024-10-18 ENCOUNTER — APPOINTMENT (EMERGENCY)
Dept: CT IMAGING | Facility: HOSPITAL | Age: 64
End: 2024-10-18
Payer: COMMERCIAL

## 2024-10-18 ENCOUNTER — APPOINTMENT (EMERGENCY)
Dept: RADIOLOGY | Facility: HOSPITAL | Age: 64
End: 2024-10-18
Payer: COMMERCIAL

## 2024-10-18 VITALS
TEMPERATURE: 97.8 F | HEART RATE: 75 BPM | RESPIRATION RATE: 16 BRPM | OXYGEN SATURATION: 97 % | DIASTOLIC BLOOD PRESSURE: 75 MMHG | SYSTOLIC BLOOD PRESSURE: 128 MMHG
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
## 2024-10-18 DIAGNOSIS — S16.1XXA ACUTE STRAIN OF NECK MUSCLE, INITIAL ENCOUNTER: Primary | ICD-10-CM

## 2024-10-18 LAB
ALBUMIN SERPL-MCNC: 4.6 G/DL (ref 3.9–4.9)
ALP SERPL-CCNC: 68 IU/L (ref 44–121)
ALT SERPL-CCNC: 18 IU/L (ref 0–32)
APPEARANCE UR: CLEAR
AST SERPL-CCNC: 24 IU/L (ref 0–40)
ATRIAL RATE: 78 BPM
BACTERIA URNS QL MICRO: NORMAL
BASOPHILS # BLD AUTO: 0 X10E3/UL (ref 0–0.2)
BASOPHILS NFR BLD AUTO: 1 %
BILIRUB SERPL-MCNC: 0.5 MG/DL (ref 0–1.2)
BILIRUB UR QL STRIP: NEGATIVE
BUN SERPL-MCNC: 15 MG/DL (ref 8–27)
BUN/CREAT SERPL: 19 (ref 12–28)
CALCIUM SERPL-MCNC: 10.1 MG/DL (ref 8.7–10.3)
CASTS URNS QL MICRO: NORMAL /LPF
CHLORIDE SERPL-SCNC: 101 MMOL/L (ref 96–106)
CHOLEST SERPL-MCNC: 186 MG/DL (ref 100–199)
CO2 SERPL-SCNC: 25 MMOL/L (ref 20–29)
COLOR UR: YELLOW
CREAT SERPL-MCNC: 0.78 MG/DL (ref 0.57–1)
EGFR: 85 ML/MIN/1.73
EOSINOPHIL # BLD AUTO: 0.1 X10E3/UL (ref 0–0.4)
EOSINOPHIL NFR BLD AUTO: 2 %
EPI CELLS #/AREA URNS HPF: NORMAL /HPF (ref 0–10)
ERYTHROCYTE [DISTWIDTH] IN BLOOD BY AUTOMATED COUNT: 12.6 % (ref 11.7–15.4)
GLOBULIN SER-MCNC: 2.1 G/DL (ref 1.5–4.5)
GLUCOSE SERPL-MCNC: 90 MG/DL (ref 70–99)
GLUCOSE UR QL: NEGATIVE
HCT VFR BLD AUTO: 41.3 % (ref 34–46.6)
HDLC SERPL-MCNC: 49 MG/DL
HGB BLD-MCNC: 14 G/DL (ref 11.1–15.9)
HGB UR QL STRIP: NEGATIVE
IMM GRANULOCYTES # BLD: 0 X10E3/UL (ref 0–0.1)
IMM GRANULOCYTES NFR BLD: 0 %
KETONES UR QL STRIP: NEGATIVE
LDLC SERPL CALC-MCNC: 112 MG/DL (ref 0–99)
LDLC/HDLC SERPL: 2.3 RATIO (ref 0–3.2)
LEUKOCYTE ESTERASE UR QL STRIP: NEGATIVE
LYMPHOCYTES # BLD AUTO: 2.3 X10E3/UL (ref 0.7–3.1)
LYMPHOCYTES NFR BLD AUTO: 38 %
MCH RBC QN AUTO: 29.8 PG (ref 26.6–33)
MCHC RBC AUTO-ENTMCNC: 33.9 G/DL (ref 31.5–35.7)
MCV RBC AUTO: 88 FL (ref 79–97)
MICRO URNS: NORMAL
MICRO URNS: NORMAL
MONOCYTES # BLD AUTO: 0.4 X10E3/UL (ref 0.1–0.9)
MONOCYTES NFR BLD AUTO: 7 %
NEUTROPHILS # BLD AUTO: 3.1 X10E3/UL (ref 1.4–7)
NEUTROPHILS NFR BLD AUTO: 52 %
NITRITE UR QL STRIP: NEGATIVE
P AXIS: 74 DEGREES
PH UR STRIP: 7.5 [PH] (ref 5–7.5)
PLATELET # BLD AUTO: 395 X10E3/UL (ref 150–450)
POTASSIUM SERPL-SCNC: 4.6 MMOL/L (ref 3.5–5.2)
PR INTERVAL: 162 MS
PROT SERPL-MCNC: 6.7 G/DL (ref 6–8.5)
PROT UR QL STRIP: NORMAL
QRS AXIS: 15 DEGREES
QRSD INTERVAL: 80 MS
QT INTERVAL: 366 MS
QTC INTERVAL: 417 MS
RBC # BLD AUTO: 4.7 X10E6/UL (ref 3.77–5.28)
RBC #/AREA URNS HPF: NORMAL /HPF (ref 0–2)
SL AMB URINALYSIS REFLEX: NORMAL
SL AMB VLDL CHOLESTEROL CALC: 25 MG/DL (ref 5–40)
SODIUM SERPL-SCNC: 139 MMOL/L (ref 134–144)
SP GR UR: 1.02 (ref 1–1.03)
T WAVE AXIS: 75 DEGREES
TRIGL SERPL-MCNC: 141 MG/DL (ref 0–149)
TSH SERPL DL<=0.005 MIU/L-ACNC: 1.19 UIU/ML (ref 0.45–4.5)
UROBILINOGEN UR STRIP-ACNC: 0.2 MG/DL (ref 0.2–1)
VENTRICULAR RATE: 78 BPM
WBC # BLD AUTO: 5.9 X10E3/UL (ref 3.4–10.8)
WBC #/AREA URNS HPF: NORMAL /HPF (ref 0–5)

## 2024-10-18 PROCEDURE — 71045 X-RAY EXAM CHEST 1 VIEW: CPT

## 2024-10-18 PROCEDURE — 93010 ELECTROCARDIOGRAM REPORT: CPT | Performed by: INTERNAL MEDICINE

## 2024-10-18 PROCEDURE — 93005 ELECTROCARDIOGRAM TRACING: CPT

## 2024-10-18 PROCEDURE — 99285 EMERGENCY DEPT VISIT HI MDM: CPT | Performed by: EMERGENCY MEDICINE

## 2024-10-18 PROCEDURE — 72125 CT NECK SPINE W/O DYE: CPT

## 2024-10-18 PROCEDURE — 99284 EMERGENCY DEPT VISIT MOD MDM: CPT
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
## 2024-10-19 LAB — COLOGUARD RESULT REPORTABLE: NEGATIVE

## 2024-10-23 ENCOUNTER — TELEPHONE (OUTPATIENT)
Age: 64
End: 2024-10-23
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
## 2024-10-30 ENCOUNTER — OFFICE VISIT (OUTPATIENT)
Dept: FAMILY MEDICINE CLINIC | Facility: HOSPITAL | Age: 64
End: 2024-10-30
Payer: COMMERCIAL

## 2024-10-30 VITALS
WEIGHT: 130 LBS | BODY MASS INDEX: 25.52 KG/M2 | OXYGEN SATURATION: 97 % | SYSTOLIC BLOOD PRESSURE: 116 MMHG | HEIGHT: 60 IN | DIASTOLIC BLOOD PRESSURE: 72 MMHG | HEART RATE: 78 BPM

## 2024-10-30 DIAGNOSIS — S20.219D CONTUSION OF CHEST WALL, UNSPECIFIED LATERALITY, SUBSEQUENT ENCOUNTER: ICD-10-CM

## 2024-10-30 DIAGNOSIS — R52 SHOOTING PAIN: ICD-10-CM
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
## 2024-10-30 DIAGNOSIS — S13.4XXD WHIPLASH INJURY TO NECK, SUBSEQUENT ENCOUNTER: ICD-10-CM

## 2024-10-30 DIAGNOSIS — T45.2X5A: ICD-10-CM

## 2024-10-30 DIAGNOSIS — G44.321 INTRACTABLE CHRONIC POST-TRAUMATIC HEADACHE: ICD-10-CM

## 2024-10-30 DIAGNOSIS — Z09 ENCOUNTER FOR EXAMINATION FOLLOWING TREATMENT AT HOSPITAL: Primary | ICD-10-CM

## 2024-10-30 DIAGNOSIS — H57.89 EYE IRRITATION: ICD-10-CM

## 2024-10-30 PROBLEM — S20.219A CONTUSION OF CHEST: Status: ACTIVE | Noted: 2024-10-30

## 2024-10-30 PROBLEM — S13.4XXA WHIPLASH INJURY TO NECK: Status: ACTIVE | Noted: 2024-10-30

## 2024-10-30 PROCEDURE — 99214 OFFICE O/P EST MOD 30 MIN: CPT | Performed by: INTERNAL MEDICINE
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
## 2024-10-30 NOTE — ASSESSMENT & PLAN NOTE
Mikengers on face then chest or hands and legs at times- is seen by Dr. Malone neurology   She is concerned that the elevated b6 level may be related   Her multivitamin was stopped on 10/27 - had 20 mg of pyridoxine in it   No other supplements  with that in it.   Has well water   Will check heavy metal levels and ask her to have her well water tested- lives in [facility]

## 2024-11-05 ENCOUNTER — TELEPHONE (OUTPATIENT)
Age: 64
End: 2024-11-05

## 2024-11-05 LAB
ARSENIC BLD-MCNC: 4 UG/L (ref 0–9)
LEAD BLD-MCNC: <1 UG/DL (ref 0–3.4)
MERCURY BLD-MCNC: 1.3 UG/L (ref 0–14.9)

## 2024-11-11 ENCOUNTER — TELEPHONE (OUTPATIENT)
Age: 64
End: 2024-11-11

## 2024-11-11 DIAGNOSIS — G44.321 INTRACTABLE CHRONIC POST-TRAUMATIC HEADACHE: Primary | ICD-10-CM

## 2024-11-11 NOTE — TELEPHONE ENCOUNTER
Patient called wanting to know specific reason for neurology referral and if it was for meningitis.  Patient also stated she has seen Dr Wolfe in past and does not wish to see her again.  Please advise and contact patient.

## 2024-11-12 NOTE — TELEPHONE ENCOUNTER
Spoke to PT - she already saw Dr. Wolfe for her headaches.  She want to get tested for meningitis - said that a lot of her symptoms are the same as meningitis.    Please advise if she needs testing done and who she could follow up with.

## 2024-11-13 NOTE — TELEPHONE ENCOUNTER
Called PT - at this time she doesn't want to go forward with a spinal tap - will wait and see if any of her doctors suggest this.

## 2024-12-11 ENCOUNTER — TELEPHONE (OUTPATIENT)
Age: 64
End: 2024-12-11
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
## 2025-01-03 ENCOUNTER — OFFICE VISIT (OUTPATIENT)
Dept: FAMILY MEDICINE CLINIC | Facility: HOSPITAL | Age: 65
End: 2025-01-03
Payer: COMMERCIAL

## 2025-01-03 VITALS
HEIGHT: 60 IN | BODY MASS INDEX: 26.11 KG/M2 | WEIGHT: 133 LBS | DIASTOLIC BLOOD PRESSURE: 78 MMHG | HEART RATE: 84 BPM | OXYGEN SATURATION: 97 % | SYSTOLIC BLOOD PRESSURE: 118 MMHG

## 2025-01-03 DIAGNOSIS — E67.8 EXCESSIVE VITAMIN B6 INTAKE: ICD-10-CM

## 2025-01-03 DIAGNOSIS — M25.552 LEFT HIP PAIN: ICD-10-CM

## 2025-01-03 DIAGNOSIS — G89.29 CHRONIC PAIN OF MULTIPLE JOINTS: ICD-10-CM

## 2025-01-03 DIAGNOSIS — E78.1 HYPERTRIGLYCERIDEMIA: ICD-10-CM

## 2025-01-03 DIAGNOSIS — Z12.31 ENCOUNTER FOR SCREENING MAMMOGRAM FOR MALIGNANT NEOPLASM OF BREAST: ICD-10-CM

## 2025-01-03 DIAGNOSIS — M25.50 CHRONIC PAIN OF MULTIPLE JOINTS: ICD-10-CM

## 2025-01-03 DIAGNOSIS — G44.221 CHRONIC TENSION-TYPE HEADACHE, INTRACTABLE: Primary | ICD-10-CM

## 2025-01-03 PROBLEM — T45.2X5A: Status: RESOLVED | Noted: 2024-10-30 | Resolved: 2025-01-03

## 2025-01-03 PROBLEM — I83.93 ASYMPTOMATIC VARICOSE VEINS OF BOTH LOWER EXTREMITIES: Status: ACTIVE | Noted: 2020-03-09

## 2025-01-03 PROCEDURE — 99214 OFFICE O/P EST MOD 30 MIN: CPT | Performed by: INTERNAL MEDICINE

## 2025-01-03 NOTE — ASSESSMENT & PLAN NOTE
Left hip pain -seeing chiropractor- not working out as much-   In bed may have left leg heaviness and pain into lower leg and into  foot

## 2025-01-03 NOTE — PROGRESS NOTES
Assessment/Plan:     Diagnosis ICD-10-CM Associated Orders   1. Chronic tension-type headache, intractable  G44.221 CBC and differential     Comprehensive metabolic panel     CBC and differential     Comprehensive metabolic panel      2. Chronic pain of multiple joints  M25.50     G89.29       3. Left hip pain  M25.552 XR hip/pelv 2-3 vws left if performed      4. Hypertriglyceridemia  E78.1 Lipid Panel with Direct LDL reflex     Lipid Panel with Direct LDL reflex      5. Excessive vitamin B6 intake  E67.8 Vitamin B6     Vitamin B6      6. Encounter for screening mammogram for malignant neoplasm of breast  Z12.31           Problem List Items Addressed This Visit        Surgery/Wound/Pain    Chronic pain of multiple joints    Left hip pain -seeing chiropractor- not working out as much-   In bed may have left leg heaviness and pain into lower leg and into  foot         Chronic tension-type headache, intractable - Primary    Using [employer] by [employer]--orders on line  also restarted on calcium and magnesium supplements         Relevant Orders    CBC and differential    Comprehensive metabolic panel   Other Visit Diagnoses       Left hip pain        Relevant Orders    XR hip/pelv 2-3 vws left if performed      Hypertriglyceridemia        Relevant Orders    Lipid Panel with Direct LDL reflex      Excessive vitamin B6 intake        Relevant Orders    Vitamin B6      Encounter for screening mammogram for malignant neoplasm of breast                Return in about 5 months (around 6/10/2025) for Annual physical.      Subjective:    Patient ID: Ilene Hoffman is a 64 y.o. female    Here for follow up   Ongoing left hip pain for several months   Some cough after gathering for holidays- mother was ill- 90   Sister is 3 years older and getttingher hip replaced- cousin also has hip issues- seeing chiropractor        The following portions of the patient's history were reviewed and updated as appropriate: allergies, current  medications and problem list.     Review of Systems   Constitutional:  Negative for fatigue.   HENT:  Negative for congestion.    Respiratory:  Negative for shortness of breath.    Cardiovascular:  Negative for chest pain and palpitations.   Gastrointestinal:  Negative for abdominal pain.   All other systems reviewed and are negative.        Objective:      Current Outpatient Medications:   •  albuterol (Proventil HFA) 90 mcg/act inhaler, Inhale 2 puffs every 6 (six) hours as needed for wheezing, Disp: 6.7 g, Rfl: 0  •  fish oil-omega-3 fatty acids 1000 MG capsule, Take by mouth if needed (Patient not taking: Reported on 1/3/2025), Disp: , Rfl:     Blood pressure 118/78, pulse 84, height 5' (1.524 m), weight 60.3 kg (133 lb), SpO2 97%, not currently breastfeeding.     Physical Exam  Vitals and nursing note reviewed.   Constitutional:       General: She is not in acute distress.  HENT:      Right Ear: Tympanic membrane normal. There is no impacted cerumen.      Left Ear: Tympanic membrane normal. There is no impacted cerumen.      Nose: Rhinorrhea present. No congestion.      Comments: Clear rhinorrhea  Eyes:      General:         Right eye: No discharge.         Left eye: No discharge.      Pupils: Pupils are equal, round, and reactive to light.   Cardiovascular:      Rate and Rhythm: Normal rate and regular rhythm.      Heart sounds: No murmur heard.  Pulmonary:      Breath sounds: No wheezing or rhonchi.   Abdominal:      Tenderness: There is no abdominal tenderness. There is no guarding.   Musculoskeletal:         General: Tenderness present.      Cervical back: No tenderness.      Right lower leg: No edema.      Left lower leg: No edema.      Comments: Left inguinal and lateral hip tenderness   Skin:     Findings: No erythema.   Neurological:      Mental Status: She is alert and oriented to person, place, and time.   Psychiatric:         Thought Content: Thought content normal.         Judgment: Judgment normal.

## 2025-01-06 ENCOUNTER — HOSPITAL ENCOUNTER (OUTPATIENT)
Dept: RADIOLOGY | Facility: HOSPITAL | Age: 65
Discharge: HOME | End: 2025-01-06

## 2025-01-06 ENCOUNTER — APPOINTMENT (OUTPATIENT)
Dept: LAB | Facility: HOSPITAL | Age: 65
End: 2025-01-06

## 2025-01-06 DIAGNOSIS — M25.552 LEFT HIP PAIN: ICD-10-CM

## 2025-01-06 PROCEDURE — 73502 X-RAY EXAM HIP UNI 2-3 VIEWS: CPT

## 2025-01-07 ENCOUNTER — RESULTS FOLLOW-UP (OUTPATIENT)
Dept: FAMILY MEDICINE CLINIC | Facility: HOSPITAL | Age: 65
End: 2025-01-07

## 2025-01-07 DIAGNOSIS — M16.0 BILATERAL HIP JOINT ARTHRITIS: Primary | ICD-10-CM

## 2025-01-08 ENCOUNTER — RESULTS FOLLOW-UP (OUTPATIENT)
Dept: FAMILY MEDICINE CLINIC | Facility: HOSPITAL | Age: 65
End: 2025-01-08

## 2025-01-09 ENCOUNTER — TELEPHONE (OUTPATIENT)
Age: 65
End: 2025-01-09

## 2025-01-09 DIAGNOSIS — E67.8 HYPERVITAMINOSIS: Primary | ICD-10-CM

## 2025-01-09 NOTE — TELEPHONE ENCOUNTER
Laura from [facility]'s billing department called in regards to bill for patient's labs.  Patient is demanding refund since provider told patient they were done correctly and need to be redone.  She is calling to verify if conversation happened.  Please call Laura back at [phone].

## 2025-01-17 LAB — VIT B6 SERPL-MCNC: 14.4 UG/L (ref 3.4–65.2)

## 2025-01-22 ENCOUNTER — OFFICE VISIT (OUTPATIENT)
Dept: OBGYN CLINIC | Facility: CLINIC | Age: 65
End: 2025-01-22
Payer: COMMERCIAL

## 2025-01-22 DIAGNOSIS — M25.552 CHRONIC LEFT HIP PAIN: Primary | ICD-10-CM

## 2025-01-22 DIAGNOSIS — M16.0 BILATERAL HIP JOINT ARTHRITIS: ICD-10-CM

## 2025-01-22 DIAGNOSIS — G89.29 CHRONIC LEFT HIP PAIN: Primary | ICD-10-CM

## 2025-01-22 DIAGNOSIS — M47.816 LUMBAR SPONDYLOSIS: ICD-10-CM

## 2025-01-22 PROCEDURE — 99204 OFFICE O/P NEW MOD 45 MIN: CPT | Performed by: ORTHOPAEDIC SURGERY
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
## 2025-01-28 ENCOUNTER — EVALUATION (OUTPATIENT)
Dept: PHYSICAL THERAPY | Facility: CLINIC | Age: 65
End: 2025-01-28

## 2025-01-28 DIAGNOSIS — G89.29 CHRONIC LEFT HIP PAIN: ICD-10-CM

## 2025-01-28 DIAGNOSIS — M47.816 LUMBAR SPONDYLOSIS: ICD-10-CM

## 2025-01-28 DIAGNOSIS — M25.552 CHRONIC LEFT HIP PAIN: ICD-10-CM

## 2025-01-28 PROCEDURE — 97162 PT EVAL MOD COMPLEX 30 MIN: CPT | Performed by: PHYSICAL THERAPIST
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
## 2025-01-30 ENCOUNTER — TELEPHONE (OUTPATIENT)
Age: 65
End: 2025-01-30

## 2025-01-30 NOTE — TELEPHONE ENCOUNTER
Patient called about a bill she received from 10/3/24 appointment.  She wanted to see if she was a self pay that appointment or if it was billed to insurance.  Spoke to Reena in office and she said Suzan will call patient back.  Patient understands.    Thank you

## 2025-03-03 ENCOUNTER — ANNUAL EXAM (OUTPATIENT)
Dept: GYNECOLOGY | Facility: CLINIC | Age: 65
End: 2025-03-03
Payer: COMMERCIAL

## 2025-03-03 VITALS
SYSTOLIC BLOOD PRESSURE: 130 MMHG | WEIGHT: 133 LBS | BODY MASS INDEX: 26.11 KG/M2 | HEIGHT: 60 IN | DIASTOLIC BLOOD PRESSURE: 82 MMHG

## 2025-03-03 DIAGNOSIS — R92.8 ABNORMAL MAMMOGRAM: ICD-10-CM

## 2025-03-03 DIAGNOSIS — Z01.419 ENCOUNTER FOR ANNUAL ROUTINE GYNECOLOGICAL EXAMINATION: Primary | ICD-10-CM

## 2025-03-03 DIAGNOSIS — Z11.51 SCREENING FOR HPV (HUMAN PAPILLOMAVIRUS): ICD-10-CM

## 2025-03-03 DIAGNOSIS — Z78.0 ASYMPTOMATIC MENOPAUSE: ICD-10-CM

## 2025-03-03 PROCEDURE — 99396 PREV VISIT EST AGE 40-64: CPT | Performed by: OBSTETRICS & GYNECOLOGY

## 2025-03-03 RX ORDER — NAPROXEN SODIUM 220 MG/1
220 TABLET, FILM COATED ORAL
COMMUNITY

## 2025-03-03 RX ORDER — ACETAMINOPHEN 325 MG/1
325 TABLET ORAL
COMMUNITY

## 2025-03-03 NOTE — PROGRESS NOTES
Name: Ilene Hoffman      : 1960      MRN: [id number]  Encounter Provider: Henrietta Rinaldi DO  Encounter Date: 3/3/2025   Encounter department: [facility]  :  Assessment & Plan  Abnormal mammogram    Orders:    Mammo diagnostic bilateral w 3d and cad; Future    Asymptomatic menopause    Orders:    DXA bone density spine hip and pelvis; Future      Pap and HPV done today, reviewed guidelines    mammogram reviewed with her including breast density.  She has her diagnostic mammogram scheduled for April.  I reviewed the previous one with her.  I will wait for this report and then order what is indicated for next year, most likely a screening mammogram.  Discussed self breast exams    colon cancer screening-negative Cologuard in October    DEXA ordered for after she turns 65.    discussed preventive care, regular exercise and a healthy diet    History of Present Illness   HPI  Ilene Hoffman is a 64 y.o. female who presents for yearly.  Last year, she was recalled for findings in the left breast.  They recommended a repeat bilateral diagnostic mammogram in 1 year which will be due in April.  She had fatty tissue and average risk    Normal Pap, negative HPV in 2022        Review of Systems   Constitutional: Negative.    Gastrointestinal: Negative.    Genitourinary: Negative.           Objective   LMP  (LMP Unknown)      Physical Exam  Vitals and nursing note reviewed. Exam conducted with a chaperone present.   Constitutional:       Appearance: She is well-developed.   HENT:      Head: Normocephalic and atraumatic.   Neck:      Thyroid: No thyromegaly.   Cardiovascular:      Rate and Rhythm: Normal rate and regular rhythm.   Pulmonary:      Effort: Pulmonary effort is normal.      Breath sounds: Normal breath sounds.   Chest:   Breasts:     Right: Normal.      Left: Normal.      Comments: Examined seated and supine  Abdominal:      Palpations: Abdomen is soft.    Genitourinary:     General: Normal vulva.      Vagina: Normal.      Cervix: Normal.      Uterus: Normal.       Adnexa: Right adnexa normal and left adnexa normal.      Rectum: Normal.   Musculoskeletal:      Cervical back: Neck supple.   Skin:     General: Skin is warm and dry.   Neurological:      Mental Status: She is alert.   Psychiatric:         Mood and Affect: Mood normal.

## 2025-03-06 LAB
CYTOLOGIST CVX/VAG CYTO: NORMAL
DX ICD CODE: NORMAL
HPV GENOTYPE REFLEX: NORMAL
HPV I/H RISK 4 DNA CVX QL PROBE+SIG AMP: NEGATIVE
OTHER STN SPEC: NORMAL
PATH REPORT.FINAL DX SPEC: NORMAL
SL AMB NOTE:: NORMAL
SL AMB SPECIMEN ADEQUACY: NORMAL
SL AMB TEST METHODOLOGY: NORMAL

## 2025-03-08 ENCOUNTER — RESULTS FOLLOW-UP (OUTPATIENT)
Dept: GYNECOLOGY | Facility: CLINIC | Age: 65
End: 2025-03-08

## 2025-04-25 ENCOUNTER — HOSPITAL ENCOUNTER (OUTPATIENT)
Dept: MAMMOGRAPHY | Facility: CLINIC | Age: 65
Discharge: HOME | End: 2025-04-25
Payer: MEDICARE

## 2025-04-25 VITALS — BODY MASS INDEX: 26.11 KG/M2 | WEIGHT: 133 LBS | HEIGHT: 60 IN

## 2025-04-25 DIAGNOSIS — Z12.31 ENCOUNTER FOR SCREENING MAMMOGRAM FOR BREAST CANCER: ICD-10-CM

## 2025-04-25 DIAGNOSIS — R92.8 ABNORMAL FINDINGS ON DIAGNOSTIC IMAGING OF BREAST: ICD-10-CM

## 2025-04-25 PROCEDURE — 77066 DX MAMMO INCL CAD BI: CPT

## 2025-04-25 PROCEDURE — G0279 TOMOSYNTHESIS, MAMMO: HCPCS

## 2025-05-02 ENCOUNTER — RESULTS FOLLOW-UP (OUTPATIENT)
Dept: FAMILY MEDICINE CLINIC | Facility: CLINIC | Age: 65
End: 2025-05-02

## 2025-05-05 ENCOUNTER — TELEPHONE (OUTPATIENT)
Age: 65
End: 2025-05-05

## 2025-05-05 NOTE — TELEPHONE ENCOUNTER
Patient called, inquired if DXA bone density scan is done I office. Advised to contact central scheduling to schedule through radiology.     Attempted to contact office to inquire if Dr Rinaldi does heel scan in office & attempted to contact [facility] on different of heel scan vs DXA. Pt mentioned they were told to have DXA completed so they are contacting CS.

## 2025-05-05 NOTE — TELEPHONE ENCOUNTER
"Caller: Ilene (patient)    Doctor: Dr. Dale Ford    Reason for call: Patient would like to know with the left mild osteoarthritis in her left hip, is that something that will bother her all the time? She said when she works out at the gym she does not feel any pain. She said she feels it when walking, sitting to standing, or standing to sitting. She is wondering if that is normal?    She was questioning if it was her right or help hip/back I read her the last OVN verbatim:   \"Findings consistent with left hip mild osteoarthritis, soft tissue pain, and lumbar spondylosis.  Left hip x-ray with radiologist report and prognosis reviewed with patient. On clinical exam patient has no pain and normal left hip evaluation. We discussed she does have lumbar spondylosis with degenerative scoliosis.\"     Call back#: [phone]   "

## 2025-05-07 NOTE — TELEPHONE ENCOUNTER
Hip pain is localized in the groin. It may be something that can bother her with overuse. If the pain is more located in the posterior aspect of hip then it is most likely coming from her back. Recommend continuing to work with PT since it may resolve her pain. Take NSAIDs as needed.

## 2025-05-07 NOTE — TELEPHONE ENCOUNTER
Called and spoke w/pt and relayed GURVINDER Flores's msg. Pt said, Thank you. Now she understands. She has been going to PT and using NSAIDs. No further questions or concerns at this time.

## 2025-07-10 ENCOUNTER — TELEPHONE (OUTPATIENT)
Dept: FAMILY MEDICINE CLINIC | Facility: HOSPITAL | Age: 65
End: 2025-07-10